# Patient Record
Sex: FEMALE | Race: WHITE | NOT HISPANIC OR LATINO | Employment: UNEMPLOYED | URBAN - NONMETROPOLITAN AREA
[De-identification: names, ages, dates, MRNs, and addresses within clinical notes are randomized per-mention and may not be internally consistent; named-entity substitution may affect disease eponyms.]

---

## 2024-06-08 ENCOUNTER — HOSPITAL ENCOUNTER (EMERGENCY)
Facility: HOSPITAL | Age: 59
Discharge: HOME | End: 2024-06-08
Attending: FAMILY MEDICINE
Payer: COMMERCIAL

## 2024-06-08 VITALS
DIASTOLIC BLOOD PRESSURE: 98 MMHG | HEART RATE: 90 BPM | WEIGHT: 140 LBS | HEIGHT: 61 IN | TEMPERATURE: 98 F | OXYGEN SATURATION: 18 % | SYSTOLIC BLOOD PRESSURE: 124 MMHG | RESPIRATION RATE: 20 BRPM | BODY MASS INDEX: 26.43 KG/M2

## 2024-06-08 DIAGNOSIS — N81.10 VAGINAL PROLAPSE: Primary | ICD-10-CM

## 2024-06-08 PROCEDURE — 63600175 PHARM REV CODE 636 W HCPCS: Performed by: FAMILY MEDICINE

## 2024-06-08 PROCEDURE — 99284 EMERGENCY DEPT VISIT MOD MDM: CPT | Mod: 25

## 2024-06-08 PROCEDURE — 96374 THER/PROPH/DIAG INJ IV PUSH: CPT

## 2024-06-08 RX ORDER — HYDROMORPHONE HYDROCHLORIDE 1 MG/ML
0.5 INJECTION, SOLUTION INTRAMUSCULAR; INTRAVENOUS; SUBCUTANEOUS
Status: COMPLETED | OUTPATIENT
Start: 2024-06-08 | End: 2024-06-08

## 2024-06-08 RX ADMIN — HYDROMORPHONE HYDROCHLORIDE 0.5 MG: 1 INJECTION, SOLUTION INTRAMUSCULAR; INTRAVENOUS; SUBCUTANEOUS at 11:06

## 2024-06-08 NOTE — ED PROVIDER NOTES
Encounter Date: 6/8/2024       History     Chief Complaint   Patient presents with    Vaginal Prolapse     Pt reports that she is having a vaginal/uterine prolapse. Reports she does not have an OB but is trying to find one. Is having a lot of pain after falling and it getting worse a few days ago.      Patient presents for evaluation of vaginal prolapse.  Patient has longstanding history of vaginal prolapse and most recent episode had difficulty reducing her prolapse.  Patient notes having some mild pain at present.  Pain has been constant since onset.  Patient denies having any aggravating or relieving features at present.  Patient denies having any other associated symptoms at present.    The history is provided by the patient.     Review of patient's allergies indicates:  No Known Allergies  History reviewed. No pertinent past medical history.  Past Surgical History:   Procedure Laterality Date    ECTOPIC PREGNANCY SURGERY       No family history on file.     Review of Systems   Constitutional: Negative.    HENT: Negative.     Eyes: Negative.    Respiratory: Negative.     Cardiovascular: Negative.    Gastrointestinal: Negative.    Endocrine: Negative.    Genitourinary: Negative.         Vaginal prolapse   Musculoskeletal: Negative.    Skin: Negative.    Allergic/Immunologic: Negative.    Neurological: Negative.    Hematological: Negative.    Psychiatric/Behavioral: Negative.         Physical Exam     Initial Vitals [06/08/24 1106]   BP Pulse Resp Temp SpO2   (!) 176/73 95 18 98.4 °F (36.9 °C) 97 %      MAP       --         Physical Exam    Vitals reviewed.  Constitutional: She appears well-developed and well-nourished. She is not diaphoretic. No distress.   HENT:   Head: Normocephalic and atraumatic.   Mouth/Throat: No oropharyngeal exudate.   Eyes: EOM are normal. Pupils are equal, round, and reactive to light. Right eye exhibits no discharge. Left eye exhibits no discharge.   Neck: Neck supple. No thyromegaly  present. No tracheal deviation present. No JVD present.   Normal range of motion.  Cardiovascular:  Normal rate, regular rhythm and normal heart sounds.           Pulmonary/Chest: Breath sounds normal. No stridor. No respiratory distress. She has no wheezes.   Abdominal: Abdomen is soft. Bowel sounds are normal. She exhibits no distension. There is no abdominal tenderness. There is no rebound and no guarding.   Genitourinary:    Genitourinary Comments: Vaginal prolapse present.  Reduced manually.     Musculoskeletal:         General: No tenderness or edema. Normal range of motion.      Cervical back: Normal range of motion and neck supple.     Lymphadenopathy:     She has no cervical adenopathy.   Neurological: She is alert and oriented to person, place, and time. She has normal reflexes.         ED Course   Procedures  Labs Reviewed - No data to display       Imaging Results    None          Medications   HYDROmorphone injection 0.5 mg (0.5 mg Intravenous Given 6/8/24 1136)     Medical Decision Making  Risk  Prescription drug management.                        Vaginal prolapse reduction.  Patient given Dilaudid half a mg prior to reduction.  Prolapsed reduced manually with pressure to vagina.  Patient tolerated procedure without difficulty.              Clinical Impression:  Final diagnoses:  [N81.10] Vaginal prolapse (Primary)          ED Disposition Condition    Discharge Stable          ED Prescriptions    None       Follow-up Information    None          Surjit Rushing MD  06/08/24 [id number]

## 2024-08-20 ENCOUNTER — OFFICE VISIT (OUTPATIENT)
Dept: OBSTETRICS AND GYNECOLOGY | Facility: CLINIC | Age: 59
End: 2024-08-20
Payer: MEDICAID

## 2024-08-20 DIAGNOSIS — N95.2 VAGINAL ATROPHY: ICD-10-CM

## 2024-08-20 DIAGNOSIS — Z12.4 SCREENING FOR MALIGNANT NEOPLASM OF THE CERVIX: ICD-10-CM

## 2024-08-20 DIAGNOSIS — N81.3 UTERINE PROCIDENTIA: Primary | ICD-10-CM

## 2024-08-20 PROCEDURE — 3074F SYST BP LT 130 MM HG: CPT | Mod: CPTII,,, | Performed by: OBSTETRICS & GYNECOLOGY

## 2024-08-20 PROCEDURE — 3008F BODY MASS INDEX DOCD: CPT | Mod: CPTII,,, | Performed by: OBSTETRICS & GYNECOLOGY

## 2024-08-20 PROCEDURE — 99214 OFFICE O/P EST MOD 30 MIN: CPT | Mod: ,,, | Performed by: OBSTETRICS & GYNECOLOGY

## 2024-08-20 PROCEDURE — 87624 HPV HI-RISK TYP POOLED RSLT: CPT | Performed by: OBSTETRICS & GYNECOLOGY

## 2024-08-20 PROCEDURE — 3078F DIAST BP <80 MM HG: CPT | Mod: CPTII,,, | Performed by: OBSTETRICS & GYNECOLOGY

## 2024-08-20 PROCEDURE — 1159F MED LIST DOCD IN RCRD: CPT | Mod: CPTII,,, | Performed by: OBSTETRICS & GYNECOLOGY

## 2024-08-20 NOTE — PROGRESS NOTES
"  Chief Complaint     Gynecologic Exam    HPI:     Patient is a 58 y.o.  presents today for evaluation of vaginal prolapse.  Symptoms began about one year ago but if worsened significantly in the last few weeks. C/o intense pressure, pain from prolapse. States, "I am unable to push it back inside". Seen in ER 24 for complaints of worsening vaginal prolapse, difficulty reducing. Prolapse reduced in ER.     Gyn History:    Menstrual History  Cycle: No  Menarche Age: 14 years  No Cycle Reason: Other  Other Reason: Menopause at 51    Menopause  Menopause Age: 1 years  Post Menopausal Bleeding: No  Hormone Replacement Therapy: No    Pap History  Last pap date:  (unknown)  History of Abnormal Pap: No  HPV Vaccine Completed: No (0/3)    [place]  Sexually Active: No  STI History: No  Contraception: Yes  Contraception Type: Tubal ligation/salpingectony    Breast History  Last Breast Imaging Date: Yes  Date:  (unknown)  History of Abnormal Breast Imaging : No  History of Breast Biopsy: No    History reviewed. No pertinent past medical history.    Past Surgical History:   Procedure Laterality Date    ECTOPIC PREGNANCY SURGERY      TUBAL LIGATION      Dr. Dayo Banegas       Family History   Problem Relation Name Age of Onset    Breast cancer Maternal Grandmother          onset unknown    Breast cancer Other MGA         onset unknown    Cervical cancer Neg Hx      Colon cancer Neg Hx      Ovarian cancer Neg Hx      Uterine cancer Neg Hx         OB History          7    Para   5    Term   4       1    AB   2    Living   4         SAB   1    IAB        Ectopic   1    Multiple        Live Births   4                 No current outpatient medications on file prior to visit.     No current facility-administered medications on file prior to visit.       Review of Systems:       Review of Systems   Constitutional:  Negative for chills and fever.   Gastrointestinal:  Negative for abdominal pain, " constipation and diarrhea.   Genitourinary:  Negative for bladder incontinence, decreased libido, dysmenorrhea, dyspareunia, dysuria, flank pain, frequency, genital sores, hematuria, hot flashes, menorrhagia, menstrual problem, pelvic pain, urgency, vaginal bleeding, vaginal discharge, vaginal pain, urinary incontinence, postcoital bleeding, postmenopausal bleeding, vaginal dryness and vaginal odor.        Vaginal prolapse         Physical Exam:    There were no vitals taken for this visit.    Physical Exam   General Exam:    General Appearance: alert, in no acute distress, normal, well nourished.  Psych:  Orientation: time, place, person.  Mood/Affect: Normal   Abdomen:  - Soft. Non-tender. No rebound tenderness or guardingNo masses. Liver normal. Spleen normal. No hernia palpable.  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  There is an ectropion of the cervix.  A Pap was obtained.  There is mild-to-moderate atrophy likely worsened in her appearance by exposure of the vaginal epithelium to air.  Prolapse was reduced.    -Adnexa normal nontender    Assessment:   1. Uterine procidentia  -     conjugated estrogens (PREMARIN) vaginal cream; Place 0.5 g vaginally once daily.  Dispense: 1 applicator; Refill: 5    2. Vaginal atrophy  -     conjugated estrogens (PREMARIN) vaginal cream; Place 0.5 g vaginally once daily.  Dispense: 1 applicator; Refill: 5    3. Screening for malignant neoplasm of the cervix  -     Liquid-Based Pap Smear, Screening             Plan:       PAP w HPV collected    Discussed the exam findings with her.  We discussed procidentia of the uterus.  Generally in young active, sexually active women hysterectomy with repair of the vaginal vault prolapse would be performed.  The ideal surgery the laparoscopic her abdominal sacral colpopexy given her age and extensive prolapse.  She does not like the idea of a pelvic mesh at this time.  We also  discussed the option for vaginal or laparoscopic hysterectomy with anterior-posterior repair sacrospinous ligament fixation axis dermis graft placement.  Discussed the placement of an Axis dermis cadeveric graft to help bolster wound healing and decrease likelihood of recurrent prolapse in the future.  We did discuss the risk of recurrent prolapse in the future.    Recommend vaginal premarin cream   Due to miscarriage surgery schedule next available appointment for this procedure would be in early October.  Patient was understanding.  We discussed possibility of temporary pessary however with take time to order in place.  She understands and agrees with the plan of care she prefers the hysterectomy anterior-posterior repair, graft placement, 6 twice ligament fixation at this time but she will consider options further in the meantime.  Rx premarin cream     RTC 3 weeks to pre-op    This note was transcribed by Lisa Zuleta. There may be transcription errors as a result, however minimal. Effort has been made to ensure accuracy of transcription, but any obvious errors or omissions should be clarified with the author of the document.

## 2024-08-21 VITALS
BODY MASS INDEX: 26.06 KG/M2 | DIASTOLIC BLOOD PRESSURE: 72 MMHG | HEIGHT: 61 IN | WEIGHT: 138 LBS | SYSTOLIC BLOOD PRESSURE: 118 MMHG

## 2024-08-21 PROBLEM — N81.3 UTERINE PROCIDENTIA: Status: ACTIVE | Noted: 2024-08-21

## 2024-08-21 PROBLEM — N95.2 VAGINAL ATROPHY: Status: ACTIVE | Noted: 2024-08-21

## 2024-09-06 NOTE — PROGRESS NOTES
"History & Physical    Subjective     History of Present Illness:  Patient is a 58 y.o. female  with uterine procidentia here today to pre admit for surgical repair.    Gyn History:    Menstrual History  Cycle: No  Menarche Age: 14 years    Menopause  Menopause Age: 1 years  Post Menopausal Bleeding: No  Hormone Replacement Therapy: Yes (vaginal premarin)    Pap History  Last pap date: 24 (unsatafacory PAP with negative HPV)  History of Abnormal Pap: No  HPV Vaccine Completed: No (0/3)    [place]  Sexually Active: No  STI History: No  Contraception: No    Breast History  Last Breast Imaging Date: No  History of Breast Biopsy: No    Per previous note 24:  Patient is a 58 y.o.  presents today for evaluation of vaginal prolapse.  Symptoms began about one year ago but if worsened significantly in the last few weeks. C/o intense pressure, pain from prolapse. States, "I am unable to push it back inside". Seen in ER 24 for complaints of worsening vaginal prolapse, difficulty reducing. Prolapse reduced in ER.     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  There is an ectropion of the cervix.  A Pap was obtained.  There is mild-to-moderate atrophy likely worsened in her appearance by exposure of the vaginal epithelium to air.  Prolapse was reduced.    -Adnexa normal nontender      Review of patient's allergies indicates:  No Known Allergies    Current Outpatient Medications   Medication Sig Dispense Refill    conjugated estrogens (PREMARIN) vaginal cream Place 0.5 g vaginally once daily. 1 applicator 5    hydroCHLOROthiazide (HYDRODIURIL) 12.5 MG Tab Take 1 tablet (12.5 mg total) by mouth once daily. 30 tablet 11     No current facility-administered medications for this visit.       History reviewed. No pertinent past medical history.  Past Surgical History:   Procedure Laterality Date    ECTOPIC PREGNANCY SURGERY  1992    TUBAL LIGATION  " "1995    Dr. Dayo Banegas     Family History   Problem Relation Name Age of Onset    Breast cancer Maternal Grandmother          onset unknown    Breast cancer Other MGA         onset unknown    Cervical cancer Neg Hx      Colon cancer Neg Hx      Ovarian cancer Neg Hx      Uterine cancer Neg Hx       Social History     Tobacco Use    Smoking status: Every Day     Current packs/day: 1.00     Average packs/day: 1 pack/day for 45.7 years (45.7 ttl pk-yrs)     Types: Cigarettes     Start date: 1979    Smokeless tobacco: Never   Substance Use Topics    Alcohol use: Not Currently     Comment: on ocassion    Drug use: Yes     Types: Marijuana        Review of Systems:  Review of Systems   Respiratory: Negative.     Cardiovascular: Negative.    Gastrointestinal: Negative.    Genitourinary:         Prolapse           Objective     Vital Signs (Most Recent)  BP: (!) 150/90 (09/10/24 1424)  5' 1" (1.549 m)  60.1 kg (132 lb 9.6 oz)     Physical Exam:  Physical Exam    Physical Exam  Gen: NAD  Cardio: RRR  Lungs CTA b/l  Abd: Soft, NT  Ext: no CCE       Assessment and Plan   1. Uterine procidentia  - Case Request Operating Room: COMBINED ANTEROPOSTERIOR COLPORRHAPHY, WITH CYSTOSCOPY, FIXATION, LIGAMENT, SACROSPINOUS, HYSTERECTOMY,VAGINAL,WITH SALPINGECTOMY  - Full code; Standing  - Vital signs; Standing  - Insert peripheral IV; Standing  - [place] to Gravity; Standing  - POCT glucose; Standing  - Notify physician if BS > 180 for hysterectomy patients; Standing  - Chlorhexidine (CHG) 2% Wipes; Standing  - Notify Physician/Vital Signs Parameters Urine output less than 0.5mL/kg/hr (with indwelling catheter) or 30 mL/hr (without indwelling catheter) or blood glucose greater than 200 mg/dL; Standing  - Notify physician; Standing  - Notify Physician - Potential Need of Opioid Reversal; Standing  - Diet NPO; Standing  - Chlorohexidine Gluconate Bath; Standing  - Place in Outpatient; Standing  - Place sequential compression device; " Standing  - Comprehensive metabolic panel; Standing  - CBC auto differential; Standing  - Pregnancy, urine rapid; Standing  - Urinalysis, Reflex to Urine Culture; Standing  - Type & Screen Pre Op; Standing  - EKG 12-lead; Standing    2. Hypertension, unspecified type        PLAN:    Again we reviewed the topic of repair similar to previous conversation.  Discussed the exam findings with her.  We discussed procidentia of the uterus.  Generally in young active, sexually active women hysterectomy with repair of the vaginal vault prolapse would be performed.  The ideal surgery the laparoscopic her abdominal sacral colpopexy given her age and extensive prolapse.  She does not like the idea of a pelvic mesh at this time.  We also discussed the option for vaginal or laparoscopic hysterectomy with anterior-posterior repair sacrospinous ligament fixation axis dermis graft placement.  Discussed the placement of a category epic graft to help bolster wound healing and decrease likelihood of recurrent prolapse in the future.  We did discuss the risk of recurrent prolapse in the future.     Continue vaginal premarin cream     She prefers vaginal hysterectomy with A&P repair sacrospinous suspension axis dermis graft placement  Will plan for surgery on 10/10/24  Consent given, NPO after midnight prior to procedure     HCTZ 12.5 for HTN  Referral for PCP establishment, HTN management   HTN precautions given      RTC post op    This note was transcribed by Lisa Zuleta. There may be transcription errors as a result, however minimal. Effort has been made to ensure accuracy of transcription, but any obvious errors or omissions should be clarified with the author of the document.       I agree with the above documentation.

## 2024-09-10 ENCOUNTER — OFFICE VISIT (OUTPATIENT)
Dept: OBSTETRICS AND GYNECOLOGY | Facility: CLINIC | Age: 59
End: 2024-09-10
Payer: MEDICAID

## 2024-09-10 VITALS
HEIGHT: 61 IN | DIASTOLIC BLOOD PRESSURE: 90 MMHG | BODY MASS INDEX: 25.04 KG/M2 | SYSTOLIC BLOOD PRESSURE: 150 MMHG | WEIGHT: 132.63 LBS

## 2024-09-10 DIAGNOSIS — I10 HYPERTENSION, UNSPECIFIED TYPE: ICD-10-CM

## 2024-09-10 DIAGNOSIS — N81.3 UTERINE PROCIDENTIA: Primary | ICD-10-CM

## 2024-09-10 PROCEDURE — 99214 OFFICE O/P EST MOD 30 MIN: CPT | Mod: ,,, | Performed by: OBSTETRICS & GYNECOLOGY

## 2024-09-10 PROCEDURE — 3077F SYST BP >= 140 MM HG: CPT | Mod: CPTII,,, | Performed by: OBSTETRICS & GYNECOLOGY

## 2024-09-10 PROCEDURE — 3008F BODY MASS INDEX DOCD: CPT | Mod: CPTII,,, | Performed by: OBSTETRICS & GYNECOLOGY

## 2024-09-10 PROCEDURE — 1159F MED LIST DOCD IN RCRD: CPT | Mod: CPTII,,, | Performed by: OBSTETRICS & GYNECOLOGY

## 2024-09-10 PROCEDURE — 3080F DIAST BP >= 90 MM HG: CPT | Mod: CPTII,,, | Performed by: OBSTETRICS & GYNECOLOGY

## 2024-09-10 RX ORDER — MUPIROCIN 20 MG/G
OINTMENT TOPICAL
OUTPATIENT
Start: 2024-09-10

## 2024-09-10 RX ORDER — FAMOTIDINE 20 MG/1
20 TABLET, FILM COATED ORAL
OUTPATIENT
Start: 2024-09-10

## 2024-09-10 RX ORDER — HYDROCHLOROTHIAZIDE 12.5 MG/1
12.5 TABLET ORAL DAILY
Qty: 30 TABLET | Refills: 11 | Status: SHIPPED | OUTPATIENT
Start: 2024-09-10 | End: 2025-09-10

## 2024-09-10 RX ORDER — CEFAZOLIN SODIUM 2 G/50ML
2 SOLUTION INTRAVENOUS
OUTPATIENT
Start: 2024-09-10

## 2024-09-10 RX ORDER — SODIUM CHLORIDE 9 MG/ML
INJECTION, SOLUTION INTRAVENOUS CONTINUOUS
OUTPATIENT
Start: 2024-09-10

## 2024-09-16 ENCOUNTER — OFFICE VISIT (OUTPATIENT)
Dept: FAMILY MEDICINE | Facility: CLINIC | Age: 59
End: 2024-09-16
Payer: MEDICAID

## 2024-09-16 VITALS
HEART RATE: 102 BPM | SYSTOLIC BLOOD PRESSURE: 156 MMHG | OXYGEN SATURATION: 98 % | TEMPERATURE: 98 F | HEIGHT: 61 IN | DIASTOLIC BLOOD PRESSURE: 98 MMHG | BODY MASS INDEX: 25.3 KG/M2 | WEIGHT: 134 LBS

## 2024-09-16 DIAGNOSIS — Z76.89 ENCOUNTER TO ESTABLISH CARE: Primary | ICD-10-CM

## 2024-09-16 DIAGNOSIS — Z12.31 ENCOUNTER FOR SCREENING MAMMOGRAM FOR MALIGNANT NEOPLASM OF BREAST: ICD-10-CM

## 2024-09-16 DIAGNOSIS — F17.200 TOBACCO DEPENDENCE: ICD-10-CM

## 2024-09-16 DIAGNOSIS — N81.3 UTERINE PROCIDENTIA: ICD-10-CM

## 2024-09-16 DIAGNOSIS — Z12.11 COLON CANCER SCREENING: ICD-10-CM

## 2024-09-16 DIAGNOSIS — I10 HYPERTENSION, UNSPECIFIED TYPE: ICD-10-CM

## 2024-09-16 PROBLEM — R03.0 ELEVATED BLOOD PRESSURE READING: Status: RESOLVED | Noted: 2024-09-16 | Resolved: 2024-09-16

## 2024-09-16 PROBLEM — R03.0 ELEVATED BLOOD PRESSURE READING: Status: ACTIVE | Noted: 2024-09-16

## 2024-09-16 PROCEDURE — 3008F BODY MASS INDEX DOCD: CPT | Mod: CPTII,,, | Performed by: NURSE PRACTITIONER

## 2024-09-16 PROCEDURE — 1160F RVW MEDS BY RX/DR IN RCRD: CPT | Mod: CPTII,,, | Performed by: NURSE PRACTITIONER

## 2024-09-16 PROCEDURE — 1159F MED LIST DOCD IN RCRD: CPT | Mod: CPTII,,, | Performed by: NURSE PRACTITIONER

## 2024-09-16 PROCEDURE — 99204 OFFICE O/P NEW MOD 45 MIN: CPT | Mod: ,,, | Performed by: NURSE PRACTITIONER

## 2024-09-16 PROCEDURE — 3080F DIAST BP >= 90 MM HG: CPT | Mod: CPTII,,, | Performed by: NURSE PRACTITIONER

## 2024-09-16 PROCEDURE — 3077F SYST BP >= 140 MM HG: CPT | Mod: CPTII,,, | Performed by: NURSE PRACTITIONER

## 2024-09-16 RX ORDER — HYDROCHLOROTHIAZIDE 12.5 MG/1
12.5 TABLET ORAL DAILY
COMMUNITY

## 2024-09-16 NOTE — ASSESSMENT & PLAN NOTE
Educated patient on need to identify triggers for cigarette smoking and to find an alternative to alleviate these triggers such as walking, eating unsalted sunflower seeds, eating carrots. Advised patient to develop a plan to quit smoking whether it is to decrease by a few cigarettes every 3-5 days or quit cold turkey and to schedule a quit day. Patient states understanding.

## 2024-09-16 NOTE — PROGRESS NOTES
Patient ID: Naye Aguilar  : 1965    Chief Complaint: Hypertension    Allergies: Patient has No Known Allergies.     History of Present Illness:  The patient is a 59 y.o. White female who presents to clinic for follow up on Hypertension     Here today to establish care. She was referred her by Dr Banegas. She has vaginal prolapse and she is set to do surgery but her blood pressure is very high. Her blood pressure is very elevated at the start of visit today as well. She denies any diagnosis of hypertension in the past.  She tells me that Dr. Banegas just sent a prescription for a blood pressure medication to her pharmacy but she has not picked it up yet.    She is feeling physically well but mentally she is feeling very stressed.  She is a smoker; started at age 15. Smokes about a ppd currently. Occasional marijuana. No other illicit drugs and does not drink ETOH.     Denies changes in bowel patterns, no melena, hematochezia, rectal pain, rectal bleeding, or changes in caliber of stool.  Does not have a family hx of colon cancer      Social History:  reports that she has been smoking cigarettes. She started smoking about 45 years ago. She has a 45.7 pack-year smoking history. She has never used smokeless tobacco. She reports that she does not currently use alcohol. She reports current drug use. Drug: Marijuana.    Past Medical History:  has no past medical history on file.    Current Medications:  Current Outpatient Medications   Medication Instructions    conjugated estrogens (PREMARIN) 0.5 g, Vaginal, Daily    hydroCHLOROthiazide (HYDRODIURIL) 12.5 mg, Oral, Daily       Review of Systems   Constitutional:  Negative for activity change, appetite change, fatigue and unexpected weight change.   HENT:  Negative for ear pain and hearing loss.    Eyes:  Negative for visual disturbance.   Respiratory:  Negative for apnea, cough, chest tightness, shortness of breath and wheezing.    Cardiovascular:  Negative for  "chest pain, palpitations, leg swelling and claudication.   Gastrointestinal:  Negative for abdominal pain, anal bleeding, blood in stool, change in bowel habit, nausea, vomiting and reflux.   Endocrine: Negative for cold intolerance, heat intolerance, polydipsia, polyphagia and polyuria.   Genitourinary:  Negative for dysuria, frequency, hematuria and urgency.   Musculoskeletal:  Negative for arthralgias.   Integumentary:  Negative for rash and mole/lesion.   Allergic/Immunologic: Negative for environmental allergies.   Neurological:  Negative for dizziness, headaches and memory loss.        Visit Vitals  BP (!) 156/98 (BP Location: Left arm, Patient Position: Sitting, BP Method: Medium (Manual))   Pulse 102   Temp 98.1 °F (36.7 °C) (Temporal)   Ht 5' 1" (1.549 m)   Wt 60.8 kg (134 lb)   SpO2 98%   BMI 25.32 kg/m²       Physical Exam  Vitals reviewed.   Constitutional:       Appearance: Normal appearance. She is normal weight.   Eyes:      Conjunctiva/sclera: Conjunctivae normal.   Cardiovascular:      Rate and Rhythm: Normal rate and regular rhythm.      Pulses: Normal pulses.      Heart sounds: Normal heart sounds.   Pulmonary:      Effort: Pulmonary effort is normal.      Breath sounds: Normal breath sounds.   Abdominal:      General: Bowel sounds are normal.      Palpations: Abdomen is soft.   Musculoskeletal:      Cervical back: Neck supple.   Skin:     General: Skin is warm and dry.      Capillary Refill: Capillary refill takes less than 2 seconds.   Neurological:      Mental Status: She is alert and oriented to person, place, and time.   Psychiatric:         Mood and Affect: Mood normal.         Behavior: Behavior normal.            Assessment & Plan:  1. Encounter to establish care  -     CBC Auto Differential; Future; Expected date: 09/16/2024  -     Comprehensive Metabolic Panel; Future; Expected date: 09/16/2024  -     Lipid Panel; Future; Expected date: 09/16/2024  -     TSH; Future; Expected date: " 09/16/2024  -     Hemoglobin A1C; Future; Expected date: 09/16/2024  -     T4, Free; Future; Expected date: 09/16/2024    2. Hypertension, unspecified type  Assessment & Plan:  Well controlled.   Start HCTZ 12.5 mg daily (as prescribed by Dr Banegas)  Low Sodium Diet (DASH Diet - Less than 2 grams of sodium per day).  Monitor blood pressure daily and log. Report consistent numbers greater than 140/90.  Maintain healthy weight with goal BMI <30. Exercise 30 minutes per day, 5 days per week.  Smoking cessation encouraged to aid in BP reduction.      Orders:  -     Ambulatory referral/consult to Family Practice  -     CBC Auto Differential; Future; Expected date: 09/16/2024  -     Comprehensive Metabolic Panel; Future; Expected date: 09/16/2024    3. Uterine procidentia  Overview:  Est with GYN-Dr Banegas      4. Tobacco dependence  Assessment & Plan:  Educated patient on need to identify triggers for cigarette smoking and to find an alternative to alleviate these triggers such as walking, eating unsalted sunflower seeds, eating carrots. Advised patient to develop a plan to quit smoking whether it is to decrease by a few cigarettes every 3-5 days or quit cold turkey and to schedule a quit day. Patient states understanding.         5. Encounter for screening mammogram for malignant neoplasm of breast  -     Mammo Digital Screening Bilat w/ [facility]; Future; Expected date: 09/16/2024    6. Colon cancer screening  -     Cologuard Screening (Multitarget Stool DNA); Future; Expected date: 09/16/2024         Follow up for 2 wk f/u, HTN, Fasting Labs prior. Call sooner if needed.    JEREMI Douglas-C

## 2024-09-16 NOTE — ASSESSMENT & PLAN NOTE
Well controlled.   Start HCTZ 12.5 mg daily (as prescribed by Dr Banegas)  Low Sodium Diet (DASH Diet - Less than 2 grams of sodium per day).  Monitor blood pressure daily and log. Report consistent numbers greater than 140/90.  Maintain healthy weight with goal BMI <30. Exercise 30 minutes per day, 5 days per week.  Smoking cessation encouraged to aid in BP reduction.

## 2024-09-17 ENCOUNTER — TELEPHONE (OUTPATIENT)
Dept: OBSTETRICS AND GYNECOLOGY | Facility: CLINIC | Age: 59
End: 2024-09-17
Payer: MEDICAID

## 2024-09-17 DIAGNOSIS — N95.2 VAGINAL ATROPHY: ICD-10-CM

## 2024-09-17 DIAGNOSIS — N81.3 UTERINE PROCIDENTIA: ICD-10-CM

## 2024-09-17 NOTE — TELEPHONE ENCOUNTER
Medication sent to [facility] per pt request.   Surgery case request is for 10/9. Per note and per calendar in Dr Freedman's office, 10/10. Will need to clarify with him when he RTC next week.   Pt voiced understanding.

## 2024-09-17 NOTE — TELEPHONE ENCOUNTER
----- Message from Aminta Fierro sent at 9/17/2024 10:27 AM CDT -----  Regarding: SURGERY  She prefers vaginal hysterectomy with A&P repair sacrospinous suspension axis dermis graft placement  Will plan for surgery on 10/10/24  Consent given, NPO after midnight prior to procedure     But its scheduled 10/9/24 can this be verified and call patient. [phone]    Also, requested to have her premarian cream sent to the [facility] instead. She made a mistake on the pharmacy request.

## 2024-09-30 ENCOUNTER — HOSPITAL ENCOUNTER (OUTPATIENT)
Dept: PREADMISSION TESTING | Facility: HOSPITAL | Age: 59
Discharge: HOME | End: 2024-09-30
Attending: OBSTETRICS & GYNECOLOGY
Payer: MEDICAID

## 2024-09-30 ENCOUNTER — HOSPITAL ENCOUNTER (OUTPATIENT)
Dept: RADIOLOGY | Facility: HOSPITAL | Age: 59
Discharge: HOME | End: 2024-09-30
Attending: NURSE PRACTITIONER
Payer: MEDICAID

## 2024-09-30 VITALS — HEIGHT: 60 IN | WEIGHT: 134 LBS | BODY MASS INDEX: 26.31 KG/M2

## 2024-09-30 DIAGNOSIS — N81.3 UTERINE PROCIDENTIA: ICD-10-CM

## 2024-09-30 DIAGNOSIS — Z12.31 ENCOUNTER FOR SCREENING MAMMOGRAM FOR MALIGNANT NEOPLASM OF BREAST: ICD-10-CM

## 2024-09-30 DIAGNOSIS — Z01.818 PRE-OP EVALUATION: ICD-10-CM

## 2024-09-30 LAB
ALBUMIN SERPL-MCNC: 4.6 G/DL (ref 3.4–5)
ALBUMIN/GLOB SERPL: 1.7 RATIO
ALP SERPL-CCNC: 84 UNIT/L (ref 50–144)
ALT SERPL-CCNC: 29 UNIT/L (ref 1–45)
ANION GAP SERPL CALC-SCNC: 5 MEQ/L (ref 2–13)
AST SERPL-CCNC: 28 UNIT/L (ref 14–36)
BACTERIA #/AREA URNS AUTO: NORMAL /HPF
BASOPHILS # BLD AUTO: 0.07 X10(3)/MCL (ref 0.01–0.08)
BASOPHILS NFR BLD AUTO: 0.8 % (ref 0.1–1.2)
BILIRUB SERPL-MCNC: 0.2 MG/DL (ref 0–1)
BILIRUB UR QL STRIP.AUTO: NEGATIVE
BUN SERPL-MCNC: 26 MG/DL (ref 7–20)
CALCIUM SERPL-MCNC: 10.2 MG/DL (ref 8.4–10.2)
CHLORIDE SERPL-SCNC: 100 MMOL/L (ref 98–110)
CLARITY UR: CLEAR
CO2 SERPL-SCNC: 34 MMOL/L (ref 21–32)
COLOR UR AUTO: YELLOW
CREAT SERPL-MCNC: 1.11 MG/DL (ref 0.66–1.25)
CREAT/UREA NIT SERPL: 23 (ref 12–20)
EOSINOPHIL # BLD AUTO: 0.42 X10(3)/MCL (ref 0.04–0.36)
EOSINOPHIL NFR BLD AUTO: 4.8 % (ref 0.7–7)
ERYTHROCYTE [DISTWIDTH] IN BLOOD BY AUTOMATED COUNT: 12.9 % (ref 11–14.5)
GFR SERPLBLD CREATININE-BSD FMLA CKD-EPI: 58 ML/MIN/1.73/M2
GLOBULIN SER-MCNC: 2.7 GM/DL (ref 2–3.9)
GLUCOSE SERPL-MCNC: 101 MG/DL (ref 70–115)
GLUCOSE UR QL STRIP: NEGATIVE
HCT VFR BLD AUTO: 37.8 % (ref 36–48)
HGB BLD-MCNC: 12.6 G/DL (ref 11.8–16)
HGB UR QL STRIP: ABNORMAL
IMM GRANULOCYTES # BLD AUTO: 0.02 X10(3)/MCL (ref 0–0.03)
IMM GRANULOCYTES NFR BLD AUTO: 0.2 % (ref 0–0.5)
KETONES UR QL STRIP: NEGATIVE
LEUKOCYTE ESTERASE UR QL STRIP: NEGATIVE
LYMPHOCYTES # BLD AUTO: 3.48 X10(3)/MCL (ref 1.16–3.74)
LYMPHOCYTES NFR BLD AUTO: 40.2 % (ref 20–55)
MCH RBC QN AUTO: 31.7 PG (ref 27–34)
MCHC RBC AUTO-ENTMCNC: 33.3 G/DL (ref 31–37)
MCV RBC AUTO: 95.2 FL (ref 79–99)
MONOCYTES # BLD AUTO: 0.77 X10(3)/MCL (ref 0.24–0.36)
MONOCYTES NFR BLD AUTO: 8.9 % (ref 4.7–12.5)
NEUTROPHILS # BLD AUTO: 3.9 X10(3)/MCL (ref 1.56–6.13)
NEUTROPHILS NFR BLD AUTO: 45.1 % (ref 37–73)
NITRITE UR QL STRIP: NEGATIVE
NRBC BLD AUTO-RTO: 0 %
PH UR STRIP: 7 [PH]
PLATELET # BLD AUTO: 331 X10(3)/MCL (ref 140–371)
PMV BLD AUTO: 11.3 FL (ref 9.4–12.4)
POTASSIUM SERPL-SCNC: 3.8 MMOL/L (ref 3.5–5.1)
PROT SERPL-MCNC: 7.3 GM/DL (ref 6.3–8.2)
PROT UR QL STRIP: NEGATIVE
RBC # BLD AUTO: 3.97 X10(6)/MCL (ref 4–5.1)
RBC #/AREA URNS AUTO: NORMAL /HPF
SODIUM SERPL-SCNC: 139 MMOL/L (ref 136–145)
SP GR UR STRIP.AUTO: 1.02 (ref 1–1.03)
SQUAMOUS #/AREA URNS AUTO: NORMAL /HPF
UROBILINOGEN UR STRIP-ACNC: 0.2
WBC # BLD AUTO: 8.66 X10(3)/MCL (ref 4–11.5)
WBC #/AREA URNS AUTO: NORMAL /HPF

## 2024-09-30 PROCEDURE — 85025 COMPLETE CBC W/AUTO DIFF WBC: CPT | Performed by: OBSTETRICS & GYNECOLOGY

## 2024-09-30 PROCEDURE — 81003 URINALYSIS AUTO W/O SCOPE: CPT | Performed by: OBSTETRICS & GYNECOLOGY

## 2024-09-30 PROCEDURE — 93005 ELECTROCARDIOGRAM TRACING: CPT

## 2024-09-30 PROCEDURE — 81015 MICROSCOPIC EXAM OF URINE: CPT | Performed by: OBSTETRICS & GYNECOLOGY

## 2024-09-30 PROCEDURE — 77067 SCR MAMMO BI INCL CAD: CPT | Mod: TC

## 2024-09-30 PROCEDURE — 80053 COMPREHEN METABOLIC PANEL: CPT | Performed by: OBSTETRICS & GYNECOLOGY

## 2024-09-30 PROCEDURE — 93010 ELECTROCARDIOGRAM REPORT: CPT | Mod: ,,, | Performed by: INTERNAL MEDICINE

## 2024-09-30 NOTE — DISCHARGE INSTRUCTIONS

## 2024-10-01 ENCOUNTER — TELEPHONE (OUTPATIENT)
Dept: OBSTETRICS AND GYNECOLOGY | Facility: CLINIC | Age: 59
End: 2024-10-01
Payer: MEDICAID

## 2024-10-01 ENCOUNTER — TELEPHONE (OUTPATIENT)
Dept: FAMILY MEDICINE | Facility: CLINIC | Age: 59
End: 2024-10-01

## 2024-10-01 DIAGNOSIS — R94.31 ABNORMAL ECG: Primary | ICD-10-CM

## 2024-10-01 DIAGNOSIS — R19.5 POSITIVE COLORECTAL CANCER SCREENING USING COLOGUARD TEST: Primary | ICD-10-CM

## 2024-10-01 LAB
OHS QRS DURATION: 82 MS
OHS QTC CALCULATION: 479 MS

## 2024-10-01 NOTE — TELEPHONE ENCOUNTER
I called the patient and relayed Dr. Banegas's message.     She stated that she doesn't have a cardiologist. I let her know that I will send a referral to [facility] in [place].     I let her know to call the office in 1-2 weeks if they haven't called to schedule an appt.     She verbalized a clear understanding and agrees with the plan of care.

## 2024-10-01 NOTE — TELEPHONE ENCOUNTER
----- Message from ASHA Malagon sent at 10/1/2024  7:17 AM CDT -----  POSITIVE Cologuard. Will need further evaluation with diagnostic colonoscopy. Please ensure the patient has been referred and scheduled to prevent delay in care.

## 2024-10-01 NOTE — TELEPHONE ENCOUNTER
----- Message from Tano Banegas MD sent at 10/1/2024  9:56 AM CDT -----  Cardiology referral for prolonged QT and abnormal ECG

## 2024-10-03 NOTE — TELEPHONE ENCOUNTER
She said that she will go to anyone in town except Dr. Thomas. However, she is scheduled for a hysterectomy on 10/10 and has a cardiology clearance on 10/8. She has not done her labs for you and isn't sure when she will be able to get out after her procedure.

## 2024-10-03 NOTE — TELEPHONE ENCOUNTER
I called and notified pt. she verbalized understanding. She is going to call us in 4-6 weeks to schedule a HTN follow up w/ labs prior once she sees how she is feeling.

## 2024-10-03 NOTE — TELEPHONE ENCOUNTER
I went a head and referred her to Dr. Clark's office.  I understand with everything she has going on that she may not be able to schedule right away.  She can just explain that to them when they call and perhaps schedule an appointment later in the year or even early next year if she feels the need.

## 2024-10-07 ENCOUNTER — PATIENT MESSAGE (OUTPATIENT)
Dept: FAMILY MEDICINE | Facility: CLINIC | Age: 59
End: 2024-10-07
Payer: MEDICAID

## 2024-10-09 ENCOUNTER — ANESTHESIA EVENT (OUTPATIENT)
Dept: SURGERY | Facility: HOSPITAL | Age: 59
End: 2024-10-09
Payer: MEDICAID

## 2024-10-09 NOTE — ANESTHESIA PREPROCEDURE EVALUATION
10/09/2024  Naye Aguilar is a 59 y.o., female.    urgical History    Procedure Laterality Date Comment Source   ECTOPIC PREGNANCY SURGERY  1992     TUBAL LIGATION  1995 Dr. Dayo Banegas      Medical History    Diagnosis Date Comment Source   Hypertension        Pre-op Assessment    I have reviewed the Patient Summary Reports.     I have reviewed the Nursing Notes. I have reviewed the NPO Status.   I have reviewed the Medications.     Review of Systems  Anesthesia Hx:  No problems with previous Anesthesia             Denies Family Hx of Anesthesia complications.    Denies Personal Hx of Anesthesia complications.                    Social:  Smoker       Hematology/Oncology:  Hematology Normal   Oncology Normal                                   EENT/Dental:  EENT/Dental Normal           Cardiovascular:  Exercise tolerance: poor   Hypertension              ECG has been reviewed. Test Reason : Z01.818,    Vent. Rate : 100 BPM     Atrial Rate : 100 BPM     P-R Int : 128 ms          QRS Dur : 082 ms      QT Int : 372 ms       P-R-T Axes : 082 068 148 degrees     QTc Int : 479 ms    Normal sinus rhythm  Minimal voltage criteria for LVH, may be normal variant ( Sokolow-Carrero )  Nonspecific ST and T wave abnormality  Prolonged QT  Abnormal ECG  No previous ECGs available  Confirmed by Manuel SANCHEZ, Mitchel ([id number]) on 10/1/2024 8:15:06 AM    Referred By: BURKE BANEGAS           Confirmed By:Mitchel Jackson MD    Specimen Collected: 09/30/24 14:06 CDT Last Resulted: 10/01/24 08:15 CDT                                  Pulmonary:  Pulmonary Normal                       Renal/:  Chronic Renal Disease   Per lab values             Hepatic/GI:  Hepatic/GI Normal                    Musculoskeletal:  Musculoskeletal Normal                Neurological:  Neurology Normal                                      Endocrine:  Endocrine Normal             Dermatological:  Skin Normal    Psych:  Psychiatric Normal                    Physical Exam  General: Well nourished, Cooperative, Alert and Oriented    Airway:  Mallampati: II / II  Mouth Opening: Normal  TM Distance: Normal  Tongue: Normal  Neck ROM: Normal ROM    Dental:  Dentures        Anesthesia Plan  Type of Anesthesia, risks & benefits discussed:    Anesthesia Type: Gen ETT  Intra-op Monitoring Plan: Standard ASA Monitors  Post Op Pain Control Plan: multimodal analgesia  Induction:  IV  Airway Plan: Direct  Informed Consent: Informed consent signed with the Patient and all parties understand the risks and agree with anesthesia plan.  All questions answered. Patient consented to blood products? Yes  ASA Score: 3  Day of Surgery Review of History & Physical: H&P Update referred to the surgeon/provider.I have interviewed and examined the patient. I have reviewed the patient's H&P dated: There are no significant changes.     Ready For Surgery From Anesthesia Perspective.     .

## 2024-10-10 ENCOUNTER — ANESTHESIA (OUTPATIENT)
Dept: SURGERY | Facility: HOSPITAL | Age: 59
End: 2024-10-10
Payer: MEDICAID

## 2024-10-10 ENCOUNTER — HOSPITAL ENCOUNTER (OUTPATIENT)
Dept: PREADMISSION TESTING | Facility: HOSPITAL | Age: 59
Discharge: HOME | End: 2024-10-10
Attending: FAMILY MEDICINE
Payer: MEDICAID

## 2024-10-10 ENCOUNTER — ANESTHESIA EVENT (OUTPATIENT)
Dept: GASTROENTEROLOGY | Facility: HOSPITAL | Age: 59
End: 2024-10-10
Payer: MEDICAID

## 2024-10-10 VITALS — WEIGHT: 135 LBS | HEIGHT: 60 IN | BODY MASS INDEX: 26.5 KG/M2

## 2024-10-10 DIAGNOSIS — Z12.11 COLON CANCER SCREENING: Primary | ICD-10-CM

## 2024-10-10 RX ORDER — SODIUM CHLORIDE, SODIUM LACTATE, POTASSIUM CHLORIDE, CALCIUM CHLORIDE 600; 310; 30; 20 MG/100ML; MG/100ML; MG/100ML; MG/100ML
INJECTION, SOLUTION INTRAVENOUS CONTINUOUS
Status: CANCELLED | OUTPATIENT
Start: 2024-10-11

## 2024-10-10 NOTE — ANESTHESIA PREPROCEDURE EVALUATION
10/10/2024  Naye Aguilar is a 59 y.o., female.      Pre-op Assessment    I have reviewed the Patient Summary Reports.     I have reviewed the Nursing Notes. I have reviewed the NPO Status.   I have reviewed the Medications.     Review of Systems  Anesthesia Hx:  No problems with previous Anesthesia             Denies Family Hx of Anesthesia complications.    Denies Personal Hx of Anesthesia complications.                    Social:  Smoker, Recreational Drugs Weed use      Hematology/Oncology:  Hematology Normal   Oncology Normal                                   EENT/Dental:  EENT/Dental Normal           Cardiovascular:  Exercise tolerance: good   Hypertension                                        Pulmonary:  Pulmonary Normal                       Renal/:  Renal/ Normal                 Hepatic/GI:  Hepatic/GI Normal                 Musculoskeletal:  Musculoskeletal Normal                Neurological:  Neurology Normal                                      Endocrine:  Endocrine Normal            Dermatological:  Skin Normal    Psych:  Psychiatric Normal                    Physical Exam  General: Well nourished, Cooperative, Alert and Oriented    Airway:  Mallampati: II / II  Mouth Opening: Normal  TM Distance: Normal  Tongue: Normal  Neck ROM: Normal ROM    Dental:  Intact        Anesthesia Plan  Type of Anesthesia, risks & benefits discussed:    Anesthesia Type: MAC  Intra-op Monitoring Plan: Standard ASA Monitors  Post Op Pain Control Plan: multimodal analgesia  Induction:  IV  Airway Plan: Direct  Informed Consent: Informed consent signed with the Patient and all parties understand the risks and agree with anesthesia plan.  All questions answered. Patient consented to blood products? Yes  ASA Score: 2  Day of Surgery Review of History & Physical: H&P Update referred to the surgeon/provider.I have  interviewed and examined the patient. I have reviewed the patient's H&P dated: There are no significant changes.     Ready For Surgery From Anesthesia Perspective.     .

## 2024-10-10 NOTE — DISCHARGE INSTRUCTIONS

## 2024-10-11 ENCOUNTER — HOSPITAL ENCOUNTER (OUTPATIENT)
Dept: GASTROENTEROLOGY | Facility: HOSPITAL | Age: 59
Discharge: HOME | End: 2024-10-11
Attending: FAMILY MEDICINE
Payer: MEDICAID

## 2024-10-11 ENCOUNTER — ANESTHESIA (OUTPATIENT)
Dept: GASTROENTEROLOGY | Facility: HOSPITAL | Age: 59
End: 2024-10-11
Payer: MEDICAID

## 2024-10-11 VITALS
RESPIRATION RATE: 18 BRPM | TEMPERATURE: 97 F | HEART RATE: 74 BPM | BODY MASS INDEX: 26.35 KG/M2 | SYSTOLIC BLOOD PRESSURE: 139 MMHG | WEIGHT: 134.94 LBS | OXYGEN SATURATION: 100 % | DIASTOLIC BLOOD PRESSURE: 63 MMHG

## 2024-10-11 DIAGNOSIS — Z12.11 COLON CANCER SCREENING: ICD-10-CM

## 2024-10-11 DIAGNOSIS — Z12.11 SCREEN FOR COLON CANCER: ICD-10-CM

## 2024-10-11 PROCEDURE — 37000008 HC ANESTHESIA 1ST 15 MINUTES

## 2024-10-11 PROCEDURE — 63600175 PHARM REV CODE 636 W HCPCS: Performed by: NURSE ANESTHETIST, CERTIFIED REGISTERED

## 2024-10-11 PROCEDURE — 37000009 HC ANESTHESIA EA ADD 15 MINS

## 2024-10-11 PROCEDURE — 27201423 OPTIME MED/SURG SUP & DEVICES STERILE SUPPLY

## 2024-10-11 PROCEDURE — 45380 COLONOSCOPY AND BIOPSY: CPT | Performed by: FAMILY MEDICINE

## 2024-10-11 RX ORDER — SODIUM CHLORIDE, SODIUM LACTATE, POTASSIUM CHLORIDE, CALCIUM CHLORIDE 600; 310; 30; 20 MG/100ML; MG/100ML; MG/100ML; MG/100ML
INJECTION, SOLUTION INTRAVENOUS CONTINUOUS
Status: DISCONTINUED | OUTPATIENT
Start: 2024-10-11 | End: 2024-10-12 | Stop reason: HOSPADM

## 2024-10-11 RX ORDER — PROPOFOL 10 MG/ML
VIAL (ML) INTRAVENOUS
Status: DISCONTINUED | OUTPATIENT
Start: 2024-10-11 | End: 2024-10-11

## 2024-10-11 RX ORDER — LIDOCAINE HYDROCHLORIDE 20 MG/ML
INJECTION INTRAVENOUS
Status: DISCONTINUED | OUTPATIENT
Start: 2024-10-11 | End: 2024-10-11

## 2024-10-11 RX ADMIN — PROPOFOL 60 MG: 10 INJECTION, EMULSION INTRAVENOUS at 01:10

## 2024-10-11 RX ADMIN — PROPOFOL 50 MG: 10 INJECTION, EMULSION INTRAVENOUS at 01:10

## 2024-10-11 RX ADMIN — LIDOCAINE HYDROCHLORIDE 50 MG: 20 INJECTION, SOLUTION INTRAVENOUS at 01:10

## 2024-10-11 RX ADMIN — PROPOFOL 70 MG: 10 INJECTION, EMULSION INTRAVENOUS at 01:10

## 2024-10-11 NOTE — DISCHARGE SUMMARY
[facility]  [facility] Stay    Colonoscopy      OUTCOME: Patient tolerated treatment/procedure well without complication and is now ready for discharge.    DISPOSITION: Home or Self Care    FINAL DIAGNOSIS:  <principal problem not specified>    FOLLOWUP: In clinic    DISCHARGE INSTRUCTIONS:  No discharge procedures on file.     TIME SPENT ON DISCHARGE:  minutes  
119

## 2024-10-11 NOTE — DISCHARGE INSTRUCTIONS
Follow-up with Dr Gabriel  Diet: as tolerated  Activity:  decrease activity today, no driving today, resume all activity tomorrow  Notify MD:  increased swelling of abdomen, excessive nausea/vomiting, excessive bright red bleeding from rectum  Medications:  continue your home medications. Keep a list of your home medications at all times for emergencies.

## 2024-10-11 NOTE — ANESTHESIA POSTPROCEDURE EVALUATION
Anesthesia Post Evaluation    Patient: Naye Aguilar    Procedure(s) Performed: * No procedures listed *    Final Anesthesia Type: MAC      Patient location during evaluation: OPS  Patient participation: Yes- Able to Participate  Level of consciousness: awake and alert, awake and oriented  Post-procedure vital signs: reviewed and stable  Pain management: adequate  Airway patency: patent    PONV status at discharge: No PONV  Anesthetic complications: no      Cardiovascular status: blood pressure returned to baseline  Respiratory status: unassisted, room air and spontaneous ventilation  Hydration status: euvolemic  Follow-up not needed.              Vitals Value Taken Time   /97 10/11/24 1000   Temp 37.1 °C (98.7 °F) 10/11/24 1000   Pulse 101 10/11/24 1000   Resp 20 10/11/24 1000   SpO2 97 % 10/11/24 1000         No case tracking events are documented in the log.      Pain/Tahmina Score: No data recorded

## 2024-10-14 ENCOUNTER — TELEPHONE (OUTPATIENT)
Dept: OBSTETRICS AND GYNECOLOGY | Facility: CLINIC | Age: 59
End: 2024-10-14
Payer: MEDICAID

## 2024-10-14 VITALS
TEMPERATURE: 97 F | WEIGHT: 134.94 LBS | BODY MASS INDEX: 26.35 KG/M2 | DIASTOLIC BLOOD PRESSURE: 63 MMHG | HEART RATE: 74 BPM | OXYGEN SATURATION: 100 % | RESPIRATION RATE: 18 BRPM | SYSTOLIC BLOOD PRESSURE: 139 MMHG

## 2024-10-14 DIAGNOSIS — N81.3 UTERINE PROCIDENTIA: Primary | ICD-10-CM

## 2024-10-14 RX ORDER — FAMOTIDINE 20 MG/1
20 TABLET, FILM COATED ORAL
Status: CANCELLED | OUTPATIENT
Start: 2024-10-14

## 2024-10-14 RX ORDER — CEFAZOLIN SODIUM 2 G/50ML
2 SOLUTION INTRAVENOUS
Status: CANCELLED | OUTPATIENT
Start: 2024-10-14

## 2024-10-14 RX ORDER — SODIUM CHLORIDE 9 MG/ML
INJECTION, SOLUTION INTRAVENOUS CONTINUOUS
Status: CANCELLED | OUTPATIENT
Start: 2024-10-14

## 2024-10-14 RX ORDER — MUPIROCIN 20 MG/G
OINTMENT TOPICAL
Status: CANCELLED | OUTPATIENT
Start: 2024-10-14

## 2024-10-14 NOTE — TELEPHONE ENCOUNTER
----- Message from Beata sent at 10/14/2024  8:41 AM CDT -----  Regarding: Call Back  Type:  Patient Returning Call    Who Called:  Who Left Message for Patient:  Does the patient know what this is regarding?:  Would the patient rather a call back or a response via [facility]?   Best Call Back Number:[phone]  Additional Information: Pt is asking when she needs to come in for a pre op because she had colonoscopy done.

## 2024-10-14 NOTE — TELEPHONE ENCOUNTER
Spoke with Dr Freedman, please inform pt Dr Freedman will be calling pt to discuss nation wide shortage of IV Fluids and potential interference with surgical cases and plan moving forward.

## 2024-10-15 ENCOUNTER — OFFICE VISIT (OUTPATIENT)
Dept: OBSTETRICS AND GYNECOLOGY | Facility: CLINIC | Age: 59
End: 2024-10-15
Payer: MEDICAID

## 2024-10-15 VITALS — SYSTOLIC BLOOD PRESSURE: 138 MMHG | DIASTOLIC BLOOD PRESSURE: 88 MMHG | BODY MASS INDEX: 25.97 KG/M2 | WEIGHT: 133 LBS

## 2024-10-15 DIAGNOSIS — R94.31 PROLONGED Q-T INTERVAL ON ECG: ICD-10-CM

## 2024-10-15 DIAGNOSIS — N81.3 UTERINE PROCIDENTIA: Primary | ICD-10-CM

## 2024-10-15 DIAGNOSIS — N95.2 VAGINAL ATROPHY: ICD-10-CM

## 2024-10-15 DIAGNOSIS — F17.200 TOBACCO DEPENDENCE: ICD-10-CM

## 2024-10-15 PROCEDURE — 1159F MED LIST DOCD IN RCRD: CPT | Mod: CPTII,,, | Performed by: OBSTETRICS & GYNECOLOGY

## 2024-10-15 PROCEDURE — 3075F SYST BP GE 130 - 139MM HG: CPT | Mod: CPTII,,, | Performed by: OBSTETRICS & GYNECOLOGY

## 2024-10-15 PROCEDURE — 3079F DIAST BP 80-89 MM HG: CPT | Mod: CPTII,,, | Performed by: OBSTETRICS & GYNECOLOGY

## 2024-10-15 PROCEDURE — 99214 OFFICE O/P EST MOD 30 MIN: CPT | Mod: ,,, | Performed by: OBSTETRICS & GYNECOLOGY

## 2024-10-15 PROCEDURE — 3008F BODY MASS INDEX DOCD: CPT | Mod: CPTII,,, | Performed by: OBSTETRICS & GYNECOLOGY

## 2024-10-15 NOTE — H&P (VIEW-ONLY)
"History & Physical    Subjective     History of Present Illness:  Patient is a 58 y.o. female  with uterine procidentia here today to pre admit for surgical repair. Previously scheduled for vaginal hysterectomy with A&P repair sacrospinous suspension axis dermis graft placement on 10/10/24, postponed due to positive Cologuard. Colonoscopy WNL with Dr Gabriel 10/11.  She was had prolonged QT interval noted on her EKG preoperatively.  She has a echocardiogram scheduled for this Friday in his not yet been cleared by Cardiology for surgery.    Gyn History:    Menstrual History  Cycle: No  Menarche Age: 14 years  No Cycle Reason: Medical ()    Menopause  Menopause Age: 1 years  Post Menopausal Bleeding: No  Hormone Replacement Therapy: No    Pap History  Last pap date: 24 (unsatafacory PAP with negative HPV)  History of Abnormal Pap: No  HPV Vaccine Completed: No    [place]  Sexually Active: No  STI History: No  Contraception: No    Breast History  Last Breast Imaging Date: Yes  Date: 10/06/24 (benign)  History of Abnormal Breast Imaging : No  History of Breast Biopsy: No    Per previous 9/10/24:  Per previous note 24:  Patient is a 58 y.o.  presents today for evaluation of vaginal prolapse.  Symptoms began about one year ago but if worsened significantly in the last few weeks. C/o intense pressure, pain from prolapse. States, "I am unable to push it back inside". Seen in ER 24 for complaints of worsening vaginal prolapse, difficulty reducing. Prolapse reduced in ER.      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  There is an ectropion of the cervix.  A Pap was obtained.  There is mild-to-moderate atrophy likely worsened in her appearance by exposure of the vaginal epithelium to air.  Prolapse was reduced.    -Adnexa normal nontender      Review of patient's allergies indicates:  No Known Allergies    Current Outpatient Medications "   Medication Sig Dispense Refill    conjugated estrogens (PREMARIN) vaginal cream Place 0.5 g vaginally once daily. 1 applicator 5    hydroCHLOROthiazide (HYDRODIURIL) 12.5 MG Tab Take 12.5 mg by mouth once daily.       No current facility-administered medications for this visit.       Past Medical History:   Diagnosis Date    Hypertension      Past Surgical History:   Procedure Laterality Date    ECTOPIC PREGNANCY SURGERY  1992    TUBAL LIGATION  1995    Dr. Dayo Banegas     Family History   Problem Relation Name Age of Onset    Breast cancer Maternal Grandmother          onset unknown    Breast cancer Other MGA         onset unknown    Cervical cancer Neg Hx      Colon cancer Neg Hx      Ovarian cancer Neg Hx      Uterine cancer Neg Hx       Social History     Tobacco Use    Smoking status: Every Day     Current packs/day: 1.00     Average packs/day: 1 pack/day for 45.8 years (45.8 ttl pk-yrs)     Types: Cigarettes     Start date: 1/1/1979    Smokeless tobacco: Never   Substance Use Topics    Alcohol use: Not Currently     Comment: on ocassion    Drug use: Yes     Types: Marijuana     Comment: 3 TIMES PER WEEK        Review of Systems:  Review of Systems   Respiratory: Negative.     Cardiovascular: Negative.    Gastrointestinal: Negative.    Genitourinary:         Prolapse           Objective     Vital Signs (Most Recent)  BP: 138/88 (10/15/24 0802)     60.3 kg (133 lb)     Physical Exam:  Physical Exam    Physical Exam  Gen: NAD  Cardio: RRR  Lungs CTA b/l  Abd: Soft, NT  Ext: no CCE         Assessment and Plan   1. Uterine procidentia    2. Prolonged Q-T interval on ECG    3. Tobacco dependence    4. Vaginal atrophy        PLAN:    Colonoscopy WNL     Again we reviewed the topic of repair similar to previous conversation.  Discussed the exam findings with her.  We discussed procidentia of the uterus.  Generally in young active, sexually active women hysterectomy with repair of the vaginal vault prolapse would  be performed.  The ideal surgery the laparoscopic her abdominal sacral colpopexy given her age and extensive prolapse.  She does not like the idea of a pelvic mesh at this time.  We also discussed the option for vaginal or laparoscopic hysterectomy with anterior-posterior repair sacrospinous ligament fixation axis dermis graft placement.  Discussed the placement of a category epic graft to help bolster wound healing and decrease likelihood of recurrent prolapse in the future.  We did discuss the risk of recurrent prolapse in the future.     Continue vaginal premarin cream      She prefers vaginal hysterectomy with A&P repair sacrospinous suspension axis dermis graft placement  We will need to follow up cardiac clearance prior to surgery.    We discussed the current IV fluid shortage and limited availability of IV fluids which would be necessary during the procedure as well as for colposcopy after the procedure.  Given the severity of her prolapse I believe surgery is warranted however we will have to await until we receive cardiac clearance and confirmed date with the hospital prior to scheduling    Consent given, NPO after midnight prior to procedure      RTC post op

## 2024-10-15 NOTE — PROGRESS NOTES
"History & Physical    Subjective     History of Present Illness:  Patient is a 58 y.o. female  with uterine procidentia here today to pre admit for surgical repair. Previously scheduled for vaginal hysterectomy with A&P repair sacrospinous suspension axis dermis graft placement on 10/10/24, postponed due to positive Cologuard. Colonoscopy WNL with Dr Gabriel 10/11.  She was had prolonged QT interval noted on her EKG preoperatively.  She has a echocardiogram scheduled for this Friday in his not yet been cleared by Cardiology for surgery.    Gyn History:    Menstrual History  Cycle: No  Menarche Age: 14 years  No Cycle Reason: Medical ()    Menopause  Menopause Age: 1 years  Post Menopausal Bleeding: No  Hormone Replacement Therapy: No    Pap History  Last pap date: 24 (unsatafacory PAP with negative HPV)  History of Abnormal Pap: No  HPV Vaccine Completed: No    [place]  Sexually Active: No  STI History: No  Contraception: No    Breast History  Last Breast Imaging Date: Yes  Date: 10/06/24 (benign)  History of Abnormal Breast Imaging : No  History of Breast Biopsy: No    Per previous 9/10/24:  Per previous note 24:  Patient is a 58 y.o.  presents today for evaluation of vaginal prolapse.  Symptoms began about one year ago but if worsened significantly in the last few weeks. C/o intense pressure, pain from prolapse. States, "I am unable to push it back inside". Seen in ER 24 for complaints of worsening vaginal prolapse, difficulty reducing. Prolapse reduced in ER.      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  There is an ectropion of the cervix.  A Pap was obtained.  There is mild-to-moderate atrophy likely worsened in her appearance by exposure of the vaginal epithelium to air.  Prolapse was reduced.    -Adnexa normal nontender      Review of patient's allergies indicates:  No Known Allergies    Current Outpatient Medications "   Medication Sig Dispense Refill    conjugated estrogens (PREMARIN) vaginal cream Place 0.5 g vaginally once daily. 1 applicator 5    hydroCHLOROthiazide (HYDRODIURIL) 12.5 MG Tab Take 12.5 mg by mouth once daily.       No current facility-administered medications for this visit.       Past Medical History:   Diagnosis Date    Hypertension      Past Surgical History:   Procedure Laterality Date    ECTOPIC PREGNANCY SURGERY  1992    TUBAL LIGATION  1995    Dr. Dayo Banegas     Family History   Problem Relation Name Age of Onset    Breast cancer Maternal Grandmother          onset unknown    Breast cancer Other MGA         onset unknown    Cervical cancer Neg Hx      Colon cancer Neg Hx      Ovarian cancer Neg Hx      Uterine cancer Neg Hx       Social History     Tobacco Use    Smoking status: Every Day     Current packs/day: 1.00     Average packs/day: 1 pack/day for 45.8 years (45.8 ttl pk-yrs)     Types: Cigarettes     Start date: 1/1/1979    Smokeless tobacco: Never   Substance Use Topics    Alcohol use: Not Currently     Comment: on ocassion    Drug use: Yes     Types: Marijuana     Comment: 3 TIMES PER WEEK        Review of Systems:  Review of Systems   Respiratory: Negative.     Cardiovascular: Negative.    Gastrointestinal: Negative.    Genitourinary:         Prolapse           Objective     Vital Signs (Most Recent)  BP: 138/88 (10/15/24 0802)     60.3 kg (133 lb)     Physical Exam:  Physical Exam    Physical Exam  Gen: NAD  Cardio: RRR  Lungs CTA b/l  Abd: Soft, NT  Ext: no CCE         Assessment and Plan   1. Uterine procidentia    2. Prolonged Q-T interval on ECG    3. Tobacco dependence    4. Vaginal atrophy        PLAN:    Colonoscopy WNL     Again we reviewed the topic of repair similar to previous conversation.  Discussed the exam findings with her.  We discussed procidentia of the uterus.  Generally in young active, sexually active women hysterectomy with repair of the vaginal vault prolapse would  be performed.  The ideal surgery the laparoscopic her abdominal sacral colpopexy given her age and extensive prolapse.  She does not like the idea of a pelvic mesh at this time.  We also discussed the option for vaginal or laparoscopic hysterectomy with anterior-posterior repair sacrospinous ligament fixation axis dermis graft placement.  Discussed the placement of a category epic graft to help bolster wound healing and decrease likelihood of recurrent prolapse in the future.  We did discuss the risk of recurrent prolapse in the future.     Continue vaginal premarin cream      She prefers vaginal hysterectomy with A&P repair sacrospinous suspension axis dermis graft placement  We will need to follow up cardiac clearance prior to surgery.    We discussed the current IV fluid shortage and limited availability of IV fluids which would be necessary during the procedure as well as for colposcopy after the procedure.  Given the severity of her prolapse I believe surgery is warranted however we will have to await until we receive cardiac clearance and confirmed date with the hospital prior to scheduling    Consent given, NPO after midnight prior to procedure      RTC post op

## 2024-10-16 PROBLEM — R94.31 PROLONGED Q-T INTERVAL ON ECG: Status: ACTIVE | Noted: 2024-10-16

## 2024-10-17 LAB — BEAKER SEE SCANNED REPORT: NORMAL

## 2024-10-23 ENCOUNTER — TELEPHONE (OUTPATIENT)
Dept: OBSTETRICS AND GYNECOLOGY | Facility: CLINIC | Age: 59
End: 2024-10-23
Payer: MEDICAID

## 2024-10-23 DIAGNOSIS — N81.3 UTERINE PROCIDENTIA: Primary | ICD-10-CM

## 2024-10-23 RX ORDER — CEFAZOLIN SODIUM 2 G/50ML
2 SOLUTION INTRAVENOUS
OUTPATIENT
Start: 2024-10-23

## 2024-10-23 RX ORDER — SODIUM CHLORIDE 9 MG/ML
INJECTION, SOLUTION INTRAVENOUS CONTINUOUS
OUTPATIENT
Start: 2024-10-23

## 2024-10-23 RX ORDER — FAMOTIDINE 20 MG/1
20 TABLET, FILM COATED ORAL
OUTPATIENT
Start: 2024-10-23

## 2024-10-23 RX ORDER — MUPIROCIN 20 MG/G
OINTMENT TOPICAL
OUTPATIENT
Start: 2024-10-23

## 2024-10-23 NOTE — PROGRESS NOTES
Cardiac clearance received.    I have scheduled her for TVH, anterior-posterior colporrhaphy, sacrospinous ligament suspension, axis dermis graft placement on 11/06/2024.    As stated in the H and P given the severity of her symptoms and interference with the ability to work and tend to activities of daily living recommend proceeding with surgery despite current IV fluid shortage.

## 2024-10-23 NOTE — TELEPHONE ENCOUNTER
Cardio clearance in chart.     Per Dr. Banegas, we will proceed with surgery on 11/06/2024 as medically urgent/necessary. With this being said, even though we are labeling this surgery as this, it still has to be reviewed by an outside party to confirm necessity. Currently, we are unable to operate unless medically urgent/necessary due to the nation wide IV fluid shortage.    Until we hear different, we are to proceed with surgery on 11/06/2024. I let her know that if something were to change, we will contact her.     I also informed her that she needs to go to the hospital either tomorrow or Monday and get her pre-admit done.     The patient agrees with plan of care and verbalized a clear understanding.     I called the patient to relay Dr. Banegas's message.     She asked if there is any way to do the surgery sooner. I let her know that unfortunately, due to scheduling, he doesn't have any sooner availability.     She stated that she was in a lot of pain and asked what can she do in the mean time. Per Dr. Banegas, she can take a clean glove with premarin cream and push everything back in to get relief or try a pessary but we may have to order one to fit her which can take a few weeks.     The patient stated that she will try the glove with premarin cream.    The patient also stated that on Monday she went to urinate and she had blood that came out, no urine. It only happened that one time.     I let her know that this may just be irritation/inflammation to the area. Monitor this for the next few days. If bleeding occurs again, call the office. If UTI symptoms (explained to patient) she can come into the office to be evaluated.     She agrees with the plan of care and verbalized a clear understanding.

## 2024-10-23 NOTE — TELEPHONE ENCOUNTER
----- Message from Brylee sent at 10/23/2024  8:36 AM CDT -----  Regarding: Pt Advice  Contact: Naye Daigle called to let us know that she is cleared for surgery. The records were faxed over to us yesterday. Patient call back number [phone].

## 2024-10-28 ENCOUNTER — HOSPITAL ENCOUNTER (OUTPATIENT)
Dept: PREADMISSION TESTING | Facility: HOSPITAL | Age: 59
Discharge: HOME | End: 2024-10-28
Attending: OBSTETRICS & GYNECOLOGY
Payer: MEDICAID

## 2024-10-28 VITALS — BODY MASS INDEX: 26.1 KG/M2 | HEIGHT: 60 IN | WEIGHT: 132.94 LBS

## 2024-10-28 DIAGNOSIS — N81.3 UTERINE PROCIDENTIA: ICD-10-CM

## 2024-10-28 LAB
ALBUMIN SERPL-MCNC: 4.4 G/DL (ref 3.4–5)
ALBUMIN/GLOB SERPL: 1.6 RATIO
ALP SERPL-CCNC: 64 UNIT/L (ref 50–144)
ALT SERPL-CCNC: 28 UNIT/L (ref 1–45)
ANION GAP SERPL CALC-SCNC: 7 MEQ/L (ref 2–13)
AST SERPL-CCNC: 31 UNIT/L (ref 14–36)
BACTERIA #/AREA URNS AUTO: ABNORMAL /HPF
BASOPHILS # BLD AUTO: 0.09 X10(3)/MCL (ref 0.01–0.08)
BASOPHILS NFR BLD AUTO: 1 % (ref 0.1–1.2)
BILIRUB SERPL-MCNC: 0.2 MG/DL (ref 0–1)
BILIRUB UR QL STRIP.AUTO: NEGATIVE
BUN SERPL-MCNC: 38 MG/DL (ref 7–20)
CALCIUM SERPL-MCNC: 10.1 MG/DL (ref 8.4–10.2)
CHLORIDE SERPL-SCNC: 100 MMOL/L (ref 98–110)
CLARITY UR: CLEAR
CO2 SERPL-SCNC: 31 MMOL/L (ref 21–32)
COLOR UR AUTO: YELLOW
CREAT SERPL-MCNC: 1.24 MG/DL (ref 0.66–1.25)
CREAT/UREA NIT SERPL: 31 (ref 12–20)
EOSINOPHIL # BLD AUTO: 0.57 X10(3)/MCL (ref 0.04–0.36)
EOSINOPHIL NFR BLD AUTO: 6.1 % (ref 0.7–7)
ERYTHROCYTE [DISTWIDTH] IN BLOOD BY AUTOMATED COUNT: 13 % (ref 11–14.5)
GFR SERPLBLD CREATININE-BSD FMLA CKD-EPI: 51 ML/MIN/1.73/M2
GLOBULIN SER-MCNC: 2.7 GM/DL (ref 2–3.9)
GLUCOSE SERPL-MCNC: 103 MG/DL (ref 70–115)
GLUCOSE UR QL STRIP: NEGATIVE
HCT VFR BLD AUTO: 37 % (ref 36–48)
HGB BLD-MCNC: 12.4 G/DL (ref 11.8–16)
HGB UR QL STRIP: ABNORMAL
IMM GRANULOCYTES # BLD AUTO: 0.03 X10(3)/MCL (ref 0–0.03)
IMM GRANULOCYTES NFR BLD AUTO: 0.3 % (ref 0–0.5)
KETONES UR QL STRIP: NEGATIVE
LEUKOCYTE ESTERASE UR QL STRIP: NEGATIVE
LYMPHOCYTES # BLD AUTO: 2.98 X10(3)/MCL (ref 1.16–3.74)
LYMPHOCYTES NFR BLD AUTO: 32.1 % (ref 20–55)
MCH RBC QN AUTO: 32 PG (ref 27–34)
MCHC RBC AUTO-ENTMCNC: 33.5 G/DL (ref 31–37)
MCV RBC AUTO: 95.4 FL (ref 79–99)
MONOCYTES # BLD AUTO: 0.66 X10(3)/MCL (ref 0.24–0.36)
MONOCYTES NFR BLD AUTO: 7.1 % (ref 4.7–12.5)
NEUTROPHILS # BLD AUTO: 4.96 X10(3)/MCL (ref 1.56–6.13)
NEUTROPHILS NFR BLD AUTO: 53.4 % (ref 37–73)
NITRITE UR QL STRIP: NEGATIVE
NRBC BLD AUTO-RTO: 0 %
PH UR STRIP: 6 [PH]
PLATELET # BLD AUTO: 328 X10(3)/MCL (ref 140–371)
PMV BLD AUTO: 11.1 FL (ref 9.4–12.4)
POTASSIUM SERPL-SCNC: 4.2 MMOL/L (ref 3.5–5.1)
PROT SERPL-MCNC: 7.1 GM/DL (ref 6.3–8.2)
PROT UR QL STRIP: NEGATIVE
RBC # BLD AUTO: 3.88 X10(6)/MCL (ref 4–5.1)
RBC #/AREA URNS AUTO: ABNORMAL /HPF
SODIUM SERPL-SCNC: 138 MMOL/L (ref 136–145)
SP GR UR STRIP.AUTO: 1.01 (ref 1–1.03)
SQUAMOUS #/AREA URNS AUTO: ABNORMAL /HPF
UROBILINOGEN UR STRIP-ACNC: 0.2
WBC # BLD AUTO: 9.29 X10(3)/MCL (ref 4–11.5)
WBC #/AREA URNS AUTO: ABNORMAL /HPF

## 2024-10-28 PROCEDURE — 80053 COMPREHEN METABOLIC PANEL: CPT | Performed by: OBSTETRICS & GYNECOLOGY

## 2024-10-28 PROCEDURE — 81003 URINALYSIS AUTO W/O SCOPE: CPT | Performed by: OBSTETRICS & GYNECOLOGY

## 2024-10-28 PROCEDURE — 85025 COMPLETE CBC W/AUTO DIFF WBC: CPT | Performed by: OBSTETRICS & GYNECOLOGY

## 2024-10-28 PROCEDURE — 87086 URINE CULTURE/COLONY COUNT: CPT | Performed by: OBSTETRICS & GYNECOLOGY

## 2024-10-28 PROCEDURE — 81015 MICROSCOPIC EXAM OF URINE: CPT | Performed by: OBSTETRICS & GYNECOLOGY

## 2024-10-30 LAB — BACTERIA UR CULT: NORMAL

## 2024-11-06 ENCOUNTER — HOSPITAL ENCOUNTER (OUTPATIENT)
Facility: HOSPITAL | Age: 59
Discharge: HOME | End: 2024-11-07
Attending: OBSTETRICS & GYNECOLOGY | Admitting: OBSTETRICS & GYNECOLOGY
Payer: MEDICAID

## 2024-11-06 DIAGNOSIS — Z98.890 S/P CYSTOSCOPY: ICD-10-CM

## 2024-11-06 DIAGNOSIS — Z98.890 S/P ANTERIOR COLPORRHAPHY: ICD-10-CM

## 2024-11-06 DIAGNOSIS — Z90.710 STATUS POST VAGINAL HYSTERECTOMY: Primary | ICD-10-CM

## 2024-11-06 DIAGNOSIS — N81.3 UTERINE PROCIDENTIA: ICD-10-CM

## 2024-11-06 DIAGNOSIS — Z90.710 S/P VAGINAL HYSTERECTOMY: ICD-10-CM

## 2024-11-06 LAB
ABORH RETYPE: NORMAL
GROUP & RH: NORMAL
HCT VFR BLD AUTO: 28.4 % (ref 36–48)
HGB BLD-MCNC: 9.7 G/DL (ref 11.8–16)
INDIRECT COOMBS: NORMAL
SPECIMEN OUTDATE: NORMAL

## 2024-11-06 PROCEDURE — 36000709 HC OR TIME LEV III EA ADD 15 MIN: Performed by: OBSTETRICS & GYNECOLOGY

## 2024-11-06 PROCEDURE — 57282 COLPOPEXY EXTRAPERITONEAL: CPT | Mod: 59,,, | Performed by: OBSTETRICS & GYNECOLOGY

## 2024-11-06 PROCEDURE — 51702 INSERT TEMP BLADDER CATH: CPT

## 2024-11-06 PROCEDURE — 57282 COLPOPEXY EXTRAPERITONEAL: CPT | Mod: AS,59,,

## 2024-11-06 PROCEDURE — 58262 VAG HYST INCLUDING T/O: CPT | Mod: ,,, | Performed by: OBSTETRICS & GYNECOLOGY

## 2024-11-06 PROCEDURE — 57260 CMBN ANT PST COLPRHY: CPT | Mod: 51,,, | Performed by: OBSTETRICS & GYNECOLOGY

## 2024-11-06 PROCEDURE — 25000003 PHARM REV CODE 250

## 2024-11-06 PROCEDURE — 63600175 PHARM REV CODE 636 W HCPCS: Performed by: OBSTETRICS & GYNECOLOGY

## 2024-11-06 PROCEDURE — 63600175 PHARM REV CODE 636 W HCPCS

## 2024-11-06 PROCEDURE — 25000003 PHARM REV CODE 250: Performed by: NURSE ANESTHETIST, CERTIFIED REGISTERED

## 2024-11-06 PROCEDURE — C1781 MESH (IMPLANTABLE): HCPCS | Performed by: OBSTETRICS & GYNECOLOGY

## 2024-11-06 PROCEDURE — 99900031 HC PATIENT EDUCATION (STAT)

## 2024-11-06 PROCEDURE — 25000003 PHARM REV CODE 250: Performed by: OBSTETRICS & GYNECOLOGY

## 2024-11-06 PROCEDURE — 99900035 HC TECH TIME PER 15 MIN (STAT)

## 2024-11-06 PROCEDURE — 94799 UNLISTED PULMONARY SVC/PX: CPT

## 2024-11-06 PROCEDURE — 63600175 PHARM REV CODE 636 W HCPCS: Performed by: NURSE ANESTHETIST, CERTIFIED REGISTERED

## 2024-11-06 PROCEDURE — 71000033 HC RECOVERY, INTIAL HOUR: Performed by: OBSTETRICS & GYNECOLOGY

## 2024-11-06 PROCEDURE — 58262 VAG HYST INCLUDING T/O: CPT | Mod: AS,,,

## 2024-11-06 PROCEDURE — 85018 HEMOGLOBIN: CPT

## 2024-11-06 PROCEDURE — 94761 N-INVAS EAR/PLS OXIMETRY MLT: CPT

## 2024-11-06 PROCEDURE — P9047 ALBUMIN (HUMAN), 25%, 50ML: HCPCS | Mod: JZ,JG | Performed by: NURSE ANESTHETIST, CERTIFIED REGISTERED

## 2024-11-06 PROCEDURE — 57260 CMBN ANT PST COLPRHY: CPT | Mod: AS,51,,

## 2024-11-06 PROCEDURE — 36000708 HC OR TIME LEV III 1ST 15 MIN: Performed by: OBSTETRICS & GYNECOLOGY

## 2024-11-06 PROCEDURE — 36415 COLL VENOUS BLD VENIPUNCTURE: CPT | Performed by: OBSTETRICS & GYNECOLOGY

## 2024-11-06 PROCEDURE — C1713 ANCHOR/SCREW BN/BN,TIS/BN: HCPCS | Performed by: OBSTETRICS & GYNECOLOGY

## 2024-11-06 PROCEDURE — 36415 COLL VENOUS BLD VENIPUNCTURE: CPT | Mod: 91

## 2024-11-06 PROCEDURE — 37000008 HC ANESTHESIA 1ST 15 MINUTES: Performed by: OBSTETRICS & GYNECOLOGY

## 2024-11-06 PROCEDURE — C2631 REP DEV, URINARY, W/O SLING: HCPCS | Performed by: OBSTETRICS & GYNECOLOGY

## 2024-11-06 PROCEDURE — 27201423 OPTIME MED/SURG SUP & DEVICES STERILE SUPPLY: Performed by: OBSTETRICS & GYNECOLOGY

## 2024-11-06 PROCEDURE — 37000009 HC ANESTHESIA EA ADD 15 MINS: Performed by: OBSTETRICS & GYNECOLOGY

## 2024-11-06 PROCEDURE — 86900 BLOOD TYPING SEROLOGIC ABO: CPT | Performed by: OBSTETRICS & GYNECOLOGY

## 2024-11-06 DEVICE — IMPLANTABLE DEVICE: Type: IMPLANTABLE DEVICE | Site: VAGINA | Status: FUNCTIONAL

## 2024-11-06 RX ORDER — DEXAMETHASONE SODIUM PHOSPHATE 4 MG/ML
INJECTION, SOLUTION INTRA-ARTICULAR; INTRALESIONAL; INTRAMUSCULAR; INTRAVENOUS; SOFT TISSUE
Status: DISCONTINUED | OUTPATIENT
Start: 2024-11-06 | End: 2024-11-06

## 2024-11-06 RX ORDER — ONDANSETRON 4 MG/1
8 TABLET, ORALLY DISINTEGRATING ORAL EVERY 8 HOURS PRN
Status: DISCONTINUED | OUTPATIENT
Start: 2024-11-06 | End: 2024-11-07 | Stop reason: HOSPADM

## 2024-11-06 RX ORDER — OXYCODONE AND ACETAMINOPHEN 5; 325 MG/1; MG/1
1 TABLET ORAL EVERY 4 HOURS PRN
Status: DISCONTINUED | OUTPATIENT
Start: 2024-11-06 | End: 2024-11-07 | Stop reason: HOSPADM

## 2024-11-06 RX ORDER — DOCUSATE SODIUM 100 MG/1
100 CAPSULE, LIQUID FILLED ORAL 2 TIMES DAILY
Status: DISCONTINUED | OUTPATIENT
Start: 2024-11-06 | End: 2024-11-07 | Stop reason: HOSPADM

## 2024-11-06 RX ORDER — MUPIROCIN 20 MG/G
OINTMENT TOPICAL 2 TIMES DAILY
Status: DISCONTINUED | OUTPATIENT
Start: 2024-11-06 | End: 2024-11-07 | Stop reason: HOSPADM

## 2024-11-06 RX ORDER — DIPHENHYDRAMINE HCL 25 MG
25 CAPSULE ORAL EVERY 4 HOURS PRN
Status: DISCONTINUED | OUTPATIENT
Start: 2024-11-06 | End: 2024-11-07 | Stop reason: HOSPADM

## 2024-11-06 RX ORDER — PROPOFOL 10 MG/ML
VIAL (ML) INTRAVENOUS
Status: DISCONTINUED | OUTPATIENT
Start: 2024-11-06 | End: 2024-11-06

## 2024-11-06 RX ORDER — OXYCODONE AND ACETAMINOPHEN 10; 325 MG/1; MG/1
1 TABLET ORAL EVERY 4 HOURS PRN
Status: DISCONTINUED | OUTPATIENT
Start: 2024-11-06 | End: 2024-11-07 | Stop reason: HOSPADM

## 2024-11-06 RX ORDER — LIDOCAINE HYDROCHLORIDE AND EPINEPHRINE 10; 10 UG/ML; MG/ML
INJECTION, SOLUTION INFILTRATION; PERINEURAL
Status: DISCONTINUED | OUTPATIENT
Start: 2024-11-06 | End: 2024-11-06 | Stop reason: HOSPADM

## 2024-11-06 RX ORDER — ACETAMINOPHEN 10 MG/ML
INJECTION, SOLUTION INTRAVENOUS
Status: DISCONTINUED | OUTPATIENT
Start: 2024-11-06 | End: 2024-11-06

## 2024-11-06 RX ORDER — EPHEDRINE SULFATE 50 MG/ML
INJECTION, SOLUTION INTRAVENOUS
Status: DISCONTINUED | OUTPATIENT
Start: 2024-11-06 | End: 2024-11-06

## 2024-11-06 RX ORDER — BISACODYL 10 MG/1
10 SUPPOSITORY RECTAL DAILY PRN
Status: DISCONTINUED | OUTPATIENT
Start: 2024-11-06 | End: 2024-11-07 | Stop reason: HOSPADM

## 2024-11-06 RX ORDER — ALBUMIN HUMAN 250 G/1000ML
SOLUTION INTRAVENOUS
Status: DISCONTINUED | OUTPATIENT
Start: 2024-11-06 | End: 2024-11-06

## 2024-11-06 RX ORDER — LIDOCAINE HYDROCHLORIDE 20 MG/ML
INJECTION INTRAVENOUS
Status: DISCONTINUED | OUTPATIENT
Start: 2024-11-06 | End: 2024-11-06

## 2024-11-06 RX ORDER — IBUPROFEN 600 MG/1
600 TABLET ORAL EVERY 6 HOURS
Status: DISCONTINUED | OUTPATIENT
Start: 2024-11-07 | End: 2024-11-07 | Stop reason: HOSPADM

## 2024-11-06 RX ORDER — FAMOTIDINE 20 MG/1
20 TABLET, FILM COATED ORAL 2 TIMES DAILY
Status: DISCONTINUED | OUTPATIENT
Start: 2024-11-06 | End: 2024-11-07 | Stop reason: HOSPADM

## 2024-11-06 RX ORDER — FAMOTIDINE 20 MG/1
20 TABLET, FILM COATED ORAL
Status: COMPLETED | OUTPATIENT
Start: 2024-11-06 | End: 2024-11-06

## 2024-11-06 RX ORDER — HYDROMORPHONE HYDROCHLORIDE 1 MG/ML
INJECTION, SOLUTION INTRAMUSCULAR; INTRAVENOUS; SUBCUTANEOUS
Status: DISCONTINUED | OUTPATIENT
Start: 2024-11-06 | End: 2024-11-06

## 2024-11-06 RX ORDER — GLYCOPYRROLATE 0.2 MG/ML
0.2 INJECTION INTRAMUSCULAR; INTRAVENOUS
Status: COMPLETED | OUTPATIENT
Start: 2024-11-06 | End: 2024-11-06

## 2024-11-06 RX ORDER — PHENYLEPHRINE HYDROCHLORIDE 10 MG/ML
INJECTION INTRAVENOUS
Status: DISCONTINUED | OUTPATIENT
Start: 2024-11-06 | End: 2024-11-06

## 2024-11-06 RX ORDER — BUPIVACAINE HYDROCHLORIDE AND EPINEPHRINE 5; 5 MG/ML; UG/ML
INJECTION, SOLUTION EPIDURAL; INTRACAUDAL; PERINEURAL
Status: DISCONTINUED | OUTPATIENT
Start: 2024-11-06 | End: 2024-11-06 | Stop reason: HOSPADM

## 2024-11-06 RX ORDER — DOCUSATE SODIUM 100 MG
300 CAPSULE ORAL
Status: DISCONTINUED | OUTPATIENT
Start: 2024-11-06 | End: 2024-11-07 | Stop reason: HOSPADM

## 2024-11-06 RX ORDER — HYDROMORPHONE HYDROCHLORIDE 1 MG/ML
1 INJECTION, SOLUTION INTRAMUSCULAR; INTRAVENOUS; SUBCUTANEOUS EVERY 6 HOURS PRN
Status: DISCONTINUED | OUTPATIENT
Start: 2024-11-06 | End: 2024-11-07 | Stop reason: HOSPADM

## 2024-11-06 RX ORDER — MIDAZOLAM HYDROCHLORIDE 1 MG/ML
2 INJECTION INTRAMUSCULAR; INTRAVENOUS
Status: COMPLETED | OUTPATIENT
Start: 2024-11-06 | End: 2024-11-06

## 2024-11-06 RX ORDER — DIPHENHYDRAMINE HYDROCHLORIDE 50 MG/ML
25 INJECTION INTRAMUSCULAR; INTRAVENOUS EVERY 4 HOURS PRN
Status: DISCONTINUED | OUTPATIENT
Start: 2024-11-06 | End: 2024-11-07 | Stop reason: HOSPADM

## 2024-11-06 RX ORDER — MUPIROCIN 20 MG/G
OINTMENT TOPICAL
Status: DISCONTINUED | OUTPATIENT
Start: 2024-11-06 | End: 2024-11-06 | Stop reason: HOSPADM

## 2024-11-06 RX ORDER — ONDANSETRON HYDROCHLORIDE 2 MG/ML
INJECTION, SOLUTION INTRAVENOUS
Status: DISCONTINUED | OUTPATIENT
Start: 2024-11-06 | End: 2024-11-06

## 2024-11-06 RX ORDER — KETOROLAC TROMETHAMINE 30 MG/ML
30 INJECTION, SOLUTION INTRAMUSCULAR; INTRAVENOUS EVERY 8 HOURS
Status: COMPLETED | OUTPATIENT
Start: 2024-11-06 | End: 2024-11-07

## 2024-11-06 RX ORDER — ROCURONIUM BROMIDE 10 MG/ML
INJECTION, SOLUTION INTRAVENOUS
Status: DISCONTINUED | OUTPATIENT
Start: 2024-11-06 | End: 2024-11-06

## 2024-11-06 RX ORDER — FAMOTIDINE 20 MG/1
20 TABLET, FILM COATED ORAL
Status: DISCONTINUED | OUTPATIENT
Start: 2024-11-06 | End: 2024-11-06

## 2024-11-06 RX ORDER — GLYCOPYRROLATE 0.2 MG/ML
INJECTION INTRAMUSCULAR; INTRAVENOUS
Status: DISCONTINUED | OUTPATIENT
Start: 2024-11-06 | End: 2024-11-06

## 2024-11-06 RX ORDER — SODIUM CHLORIDE 9 MG/ML
INJECTION, SOLUTION INTRAVENOUS CONTINUOUS
Status: DISCONTINUED | OUTPATIENT
Start: 2024-11-06 | End: 2024-11-06

## 2024-11-06 RX ORDER — CEFAZOLIN 2 G/1
2 INJECTION, POWDER, FOR SOLUTION INTRAMUSCULAR; INTRAVENOUS
Status: COMPLETED | OUTPATIENT
Start: 2024-11-06 | End: 2024-11-06

## 2024-11-06 RX ORDER — SODIUM CHLORIDE 0.9 G/100ML
IRRIGANT IRRIGATION
Status: DISCONTINUED | OUTPATIENT
Start: 2024-11-06 | End: 2024-11-06 | Stop reason: HOSPADM

## 2024-11-06 RX ADMIN — PHENYLEPHRINE HYDROCHLORIDE 100 MCG: 10 INJECTION, SOLUTION INTRAVENOUS at 10:11

## 2024-11-06 RX ADMIN — EPHEDRINE SULFATE 15 MG: 50 INJECTION, SOLUTION INTRAVENOUS at 09:11

## 2024-11-06 RX ADMIN — ROCURONIUM BROMIDE 50 MG: 10 INJECTION, SOLUTION INTRAVENOUS at 08:11

## 2024-11-06 RX ADMIN — FAMOTIDINE 20 MG: 20 TABLET, FILM COATED ORAL at 07:11

## 2024-11-06 RX ADMIN — Medication 300 ML: at 03:11

## 2024-11-06 RX ADMIN — GLYCOPYRROLATE 0.2 MG: 0.2 INJECTION INTRAMUSCULAR; INTRAVENOUS at 07:11

## 2024-11-06 RX ADMIN — OXYCODONE HYDROCHLORIDE AND ACETAMINOPHEN 1 TABLET: 10; 325 TABLET ORAL at 07:11

## 2024-11-06 RX ADMIN — ONDANSETRON 4 MG: 2 INJECTION INTRAMUSCULAR; INTRAVENOUS at 08:11

## 2024-11-06 RX ADMIN — ACETAMINOPHEN 1000 MG: 1000 INJECTION, SOLUTION INTRAVENOUS at 10:11

## 2024-11-06 RX ADMIN — Medication 300 ML: at 09:11

## 2024-11-06 RX ADMIN — ALBUMIN (HUMAN) 50 ML: 12.5 SOLUTION INTRAVENOUS at 10:11

## 2024-11-06 RX ADMIN — PROPOFOL 200 MG: 10 INJECTION, EMULSION INTRAVENOUS at 08:11

## 2024-11-06 RX ADMIN — OXYCODONE HYDROCHLORIDE AND ACETAMINOPHEN 1 TABLET: 10; 325 TABLET ORAL at 02:11

## 2024-11-06 RX ADMIN — PHENYLEPHRINE HYDROCHLORIDE 100 MCG: 10 INJECTION, SOLUTION INTRAVENOUS at 11:11

## 2024-11-06 RX ADMIN — EPHEDRINE SULFATE 20 MG: 50 INJECTION, SOLUTION INTRAVENOUS at 09:11

## 2024-11-06 RX ADMIN — CEFAZOLIN 2 G: 2 INJECTION, POWDER, FOR SOLUTION INTRAMUSCULAR; INTRAVENOUS at 09:11

## 2024-11-06 RX ADMIN — HYDROMORPHONE HYDROCHLORIDE 1 MG: 1 INJECTION, SOLUTION INTRAMUSCULAR; INTRAVENOUS; SUBCUTANEOUS at 08:11

## 2024-11-06 RX ADMIN — LIDOCAINE HYDROCHLORIDE 100 MG: 20 INJECTION, SOLUTION INTRAVENOUS at 08:11

## 2024-11-06 RX ADMIN — DOCUSATE SODIUM 100 MG: 100 CAPSULE, LIQUID FILLED ORAL at 09:11

## 2024-11-06 RX ADMIN — SODIUM CHLORIDE: 9 INJECTION, SOLUTION INTRAVENOUS at 10:11

## 2024-11-06 RX ADMIN — GLYCOPYRROLATE 0.2 MG: 0.2 INJECTION INTRAMUSCULAR; INTRAVENOUS at 08:11

## 2024-11-06 RX ADMIN — SODIUM CHLORIDE: 9 INJECTION, SOLUTION INTRAVENOUS at 08:11

## 2024-11-06 RX ADMIN — FAMOTIDINE 20 MG: 20 TABLET, FILM COATED ORAL at 09:11

## 2024-11-06 RX ADMIN — MIDAZOLAM HYDROCHLORIDE 2 MG: 1 INJECTION, SOLUTION INTRAMUSCULAR; INTRAVENOUS at 08:11

## 2024-11-06 RX ADMIN — SUGAMMADEX 200 MG: 100 INJECTION, SOLUTION INTRAVENOUS at 11:11

## 2024-11-06 RX ADMIN — DEXAMETHASONE SODIUM PHOSPHATE 8 MG: 4 INJECTION, SOLUTION INTRA-ARTICULAR; INTRALESIONAL; INTRAMUSCULAR; INTRAVENOUS; SOFT TISSUE at 08:11

## 2024-11-06 RX ADMIN — GLYCOPYRROLATE 0.2 MG: 0.2 INJECTION INTRAMUSCULAR; INTRAVENOUS at 09:11

## 2024-11-06 RX ADMIN — KETOROLAC TROMETHAMINE 30 MG: 30 INJECTION, SOLUTION INTRAMUSCULAR at 01:11

## 2024-11-06 RX ADMIN — MUPIROCIN: 20 OINTMENT TOPICAL at 07:11

## 2024-11-06 RX ADMIN — Medication 300 ML: at 01:11

## 2024-11-06 RX ADMIN — KETOROLAC TROMETHAMINE 30 MG: 30 INJECTION, SOLUTION INTRAMUSCULAR at 09:11

## 2024-11-06 NOTE — PLAN OF CARE
Patient AAOx4, VSS. Denies pain, sob, and no distress observed. Meets criteria for transition of care

## 2024-11-06 NOTE — NURSING
Ambulated with patient in whiteside and around nurses station. Tolerated well. No complaints voiced.

## 2024-11-06 NOTE — ANESTHESIA POSTPROCEDURE EVALUATION
Anesthesia Post Evaluation    Patient: Naye Aguilar    Procedure(s) Performed: Procedure(s) (LRB):  COMBINED ANTEROPOSTERIOR COLPORRHAPHY, WITH CYSTOSCOPY (N/A)  HYSTERECTOMY,VAGINAL,WITH SALPINGO-OOPHORECTOMY (Bilateral)  FIXATION, LIGAMENT, SACROSPINOUS (Bilateral)    Final Anesthesia Type: general      Patient location during evaluation: PACU  Patient participation: Yes- Able to Participate  Level of consciousness: awake and alert  Post-procedure vital signs: reviewed and stable  Pain management: adequate  Airway patency: patent  JOSE DANIEL mitigation strategies: Multimodal analgesia  PONV status at discharge: No PONV  Anesthetic complications: no      Cardiovascular status: blood pressure returned to baseline and hemodynamically stable  Respiratory status: unassisted, spontaneous ventilation and room air  Hydration status: euvolemic  Follow-up not needed.              Vitals Value Taken Time   /71 11/06/24 1205   Temp 36.2 °C (97.1 °F) 11/06/24 1205   Pulse 74 11/06/24 1209   Resp 16 11/06/24 1205   SpO2 96 % 11/06/24 1209   Vitals shown include unfiled device data.      Event Time   Out of Recovery 12:10:42         Pain/Tahmina Score: Tahmina Score: 9 (11/6/2024 12:09 PM)

## 2024-11-06 NOTE — ANESTHESIA PROCEDURE NOTES
Intubation    Date/Time: 11/6/2024 8:52 AM    Performed by: Abdiel Ross CRNA  Authorized by: Abdiel Ross CRNA    Intubation:     Induction:  Intravenous    Intubated:  Postinduction    Mask Ventilation:  Easy mask    Attempts:  1    Attempted By:  CRNA    Method of Intubation:  Direct    Blade:  Meyer 2    Laryngeal View Grade: Grade I - full view of cords      Difficult Airway Encountered?: No      Complications:  None    Airway Device:  Oral endotracheal tube    Airway Device Size:  7.0    Style/Cuff Inflation:  Cuffed (inflated to minimal occlusive pressure)    Tube secured:  21    Secured at:  The lips    Placement Verified By:  Capnometry    Complicating Factors:  None    Findings Post-Intubation:  BS equal bilateral and atraumatic/condition of teeth unchanged

## 2024-11-06 NOTE — OP NOTE
DATE OF PROCEDURE: 11/6/2024    PRE-OP DIAGNOSIS:  Uterine procidentia [N81.3]    POST-OP DIAGNOSIS:  Post-Op Diagnosis Codes:     * Uterine procidentia [N81.3]    PROCEDURE:   Transvaginal Hysterectomy  Bilateral Salpingo-oophorectomy  Anterior and Posterior Colporrhaphy  Sacrospinous Ligament Suspension  Axis Dermis Graft Placement  Cystoscopy    SURGEON: Tano Banegas MD    ASSISTANT: Kim Leiva NP    ANESTHESIA: General Endotracheal    PATHOLOGY: Uterus, Fallopian tubes, Ovaries, Cervix    ESTIMATED BLOOD LOSS:  25 cc    PROCEDURE IN DETAIL:  After consents were reviewed, patient was taken to the operating room where a time-out was held.  She was placed on the OR table, and after induction of heavy sedation, placed in the dorsal lithotomy position in Arturo stirrups and prepped and draped in standard sterile fashion.      A weighted speculum was placed in the posterior vagina.  The cervix was identified and grasped with triple tooth Dory tenaculums, anteriorly and posteriorly.  The cervix was then injected with 0.5% Marcaine with epinephrine, circumferentially.  A scalpel was used to make an incision around the cervix at the cervical vaginal junction.  The bladder was dissected from the cervix anteriorly and deflected anteriorly with a right angle retractor.  The posterior cul-de-sac was entered sharply with Montes scissors.  The posterior peritoneum was tagged to the posterior vagina with an 0 Vicryl suture.  A curved Justin clamp was placed on the left uterosacral ligament.  This pedicle was cut and secured with 0 Vicryl suture and tagged with a hemostat.  The right uterosacral ligament was taken, similarly.  A long weighted speculum was then placed posteriorly to deflect the rectum.  Enseal device was used to take subsequent bits up the cardinal ligaments, uterine arteries, and broad ligaments bilaterally.  Once reaching the level of the tubal insertion and round ligaments, curve Justin clamps were placed x 2  around each remaining pedicle. The uterus was excised and handed off.  The pedicles were then secures with free-ties followed by fore-and-aft sutures, each of 0 Vicryl. The left Fallopian tube was grasped with a Jose clamp and removed with Enseal device.  Right Fallopian tube was removed, similarly.  Ovaries were small and atrophic appearing and were readily available for excision.  They were removed with Enseal device with good hemostasis.  All pedicles were inspected and were hemostatic.      The peritoneum was closed with a pursestring suture of 0 Vicryl, approximating the uterosacral ligaments and obliterating the cul-de-sac.  The vaginal cuff was then closed with 0-Vicryl sutures with good hemostasis.  The vagina was irrigated.     An Allis clamp was used to grasp the vagina just under the bladder neck.  Two additional Allis clamps were used to grasped vagina on either side of the midline of the anterior vaginal cuff.   The anterior vagina was hydro dissected from the underlying bladder with dilute lidocaine with epinephrine.  Metzenbaum scissors were used to undermine the vaginal and incise it in the midline.  Anterior vagina was opened from the cuff to the bladder neck.  Metzenbaum scissors were then used to undermined the vagina, bilaterally, exposing the endopelvic fascia.  Tara plication sutures of 0 Vicryl were used to reapproximate the endopelvic fascia correcting the anterior defect and providing support for the bladder in the midline.      Digital dissection of the pelvic sidewall to the Ischial spines was performed bilaterally, exposed the sacrospinous ligaments on either side.  Coloplast Safron device was used to anchor Gortex suture to the ligaments bilaterally.  Axis Dermis graft was cut to size in a Y fashion and the arms were secured to the Gortex sutures. 2-0 Vicryl was used to secure the graft to the vaginal cuff.  The Gortex sutures were then tied elevating the vaginal cuff.  Sutures were  trimmed.  2-0 Vicryl sutures were used to secure the graft to the distal vagina.  Vagina was then closed with 2 0 Vicryl sutures to the vaginal cuff. Vaginal cuff itself was then fully closed with a 0 Vicryl suture.    The perineal body and posterior vagina were injected with 30 cc dilate lidocaine with epinephrine for hydro dissection.  A scalpel was used to incise and remove a akin shape pieces of skin from the posterior labia, introitus and perineal body.  Metzenbaum scissors was used to undermine and incise the posterior vagina yan the midline from the introitus to 2 cm from the vaginal apex with care to dissection through the full thickness of the posterior vagina. The vagina was then dissected laterally exposing the rectovaginal connective tissue.  2-0 Vicryl Tara plication sutures were placed to correct the rectocele.      The perineal body was reconstructed with interrupted 0 vicryl sutures narrowing the introitus to about 3-4 cm in diameter.  3-0 Vicryl suture was used to close the skin of the perineal body.      A digital rectal exam was performed.  There was no evidence of rectal mucosal injury or obstruction.    A cystoscope was then inserted into the urethra and advanced into the bladder.  Normal urethral mucosa and bladder mucosa was noted and efflux was noted from bilateral ureteral orifices.  Cystoscope was removed bladder was drained.     A Huston catheter was placed. A premarin covered Kerlex was then inserted into the vagina for packing.     Patient was awakened and taken to the recovery room in stable condition having tolerated the procedure very well.  Sponge lap needle count correct.    I agree with anesthesia's plan of care.

## 2024-11-07 VITALS
SYSTOLIC BLOOD PRESSURE: 127 MMHG | TEMPERATURE: 99 F | BODY MASS INDEX: 26.1 KG/M2 | OXYGEN SATURATION: 100 % | WEIGHT: 132.94 LBS | HEART RATE: 88 BPM | DIASTOLIC BLOOD PRESSURE: 72 MMHG | HEIGHT: 60 IN | RESPIRATION RATE: 20 BRPM

## 2024-11-07 PROBLEM — Z90.710 STATUS POST VAGINAL HYSTERECTOMY: Status: ACTIVE | Noted: 2024-11-07

## 2024-11-07 PROBLEM — R33.9 URINARY RETENTION: Status: ACTIVE | Noted: 2024-11-07

## 2024-11-07 LAB
BASOPHILS # BLD AUTO: 0.04 X10(3)/MCL (ref 0.01–0.08)
BASOPHILS NFR BLD AUTO: 0.4 % (ref 0.1–1.2)
EOSINOPHIL # BLD AUTO: 0.04 X10(3)/MCL (ref 0.04–0.36)
EOSINOPHIL NFR BLD AUTO: 0.4 % (ref 0.7–7)
ERYTHROCYTE [DISTWIDTH] IN BLOOD BY AUTOMATED COUNT: 12.7 % (ref 11–14.5)
HCT VFR BLD AUTO: 27.8 % (ref 36–48)
HGB BLD-MCNC: 9.4 G/DL (ref 11.8–16)
IMM GRANULOCYTES # BLD AUTO: 0.04 X10(3)/MCL (ref 0–0.03)
IMM GRANULOCYTES NFR BLD AUTO: 0.4 % (ref 0–0.5)
LYMPHOCYTES # BLD AUTO: 2.24 X10(3)/MCL (ref 1.16–3.74)
LYMPHOCYTES NFR BLD AUTO: 20 % (ref 20–55)
MCH RBC QN AUTO: 31.9 PG (ref 27–34)
MCHC RBC AUTO-ENTMCNC: 33.8 G/DL (ref 31–37)
MCV RBC AUTO: 94.2 FL (ref 79–99)
MONOCYTES # BLD AUTO: 1.23 X10(3)/MCL (ref 0.24–0.36)
MONOCYTES NFR BLD AUTO: 11 % (ref 4.7–12.5)
NEUTROPHILS # BLD AUTO: 7.62 X10(3)/MCL (ref 1.56–6.13)
NEUTROPHILS NFR BLD AUTO: 67.8 % (ref 37–73)
NRBC BLD AUTO-RTO: 0 %
PLATELET # BLD AUTO: 230 X10(3)/MCL (ref 140–371)
PMV BLD AUTO: 11.6 FL (ref 9.4–12.4)
RBC # BLD AUTO: 2.95 X10(6)/MCL (ref 4–5.1)
WBC # BLD AUTO: 11.21 X10(3)/MCL (ref 4–11.5)

## 2024-11-07 PROCEDURE — 99900031 HC PATIENT EDUCATION (STAT)

## 2024-11-07 PROCEDURE — 85025 COMPLETE CBC W/AUTO DIFF WBC: CPT

## 2024-11-07 PROCEDURE — 36415 COLL VENOUS BLD VENIPUNCTURE: CPT

## 2024-11-07 PROCEDURE — 99900035 HC TECH TIME PER 15 MIN (STAT)

## 2024-11-07 PROCEDURE — 94761 N-INVAS EAR/PLS OXIMETRY MLT: CPT

## 2024-11-07 PROCEDURE — 63600175 PHARM REV CODE 636 W HCPCS

## 2024-11-07 PROCEDURE — 51702 INSERT TEMP BLADDER CATH: CPT

## 2024-11-07 PROCEDURE — 25000003 PHARM REV CODE 250

## 2024-11-07 PROCEDURE — 51798 US URINE CAPACITY MEASURE: CPT

## 2024-11-07 RX ORDER — DOCUSATE SODIUM 100 MG/1
100 CAPSULE, LIQUID FILLED ORAL 2 TIMES DAILY
Qty: 30 CAPSULE | Refills: 1 | Status: SHIPPED | OUTPATIENT
Start: 2024-11-07 | End: 2025-01-06

## 2024-11-07 RX ORDER — IBUPROFEN 600 MG/1
600 TABLET ORAL EVERY 6 HOURS PRN
Qty: 120 TABLET | Refills: 2 | Status: SHIPPED | OUTPATIENT
Start: 2024-11-07

## 2024-11-07 RX ORDER — HYDROCODONE BITARTRATE AND ACETAMINOPHEN 7.5; 325 MG/1; MG/1
1 TABLET ORAL EVERY 6 HOURS PRN
Qty: 12 TABLET | Refills: 0 | Status: SHIPPED | OUTPATIENT
Start: 2024-11-07

## 2024-11-07 RX ADMIN — DOCUSATE SODIUM 100 MG: 100 CAPSULE, LIQUID FILLED ORAL at 08:11

## 2024-11-07 RX ADMIN — Medication 300 ML: at 12:11

## 2024-11-07 RX ADMIN — Medication 300 ML: at 05:11

## 2024-11-07 RX ADMIN — FAMOTIDINE 20 MG: 20 TABLET, FILM COATED ORAL at 08:11

## 2024-11-07 RX ADMIN — Medication 300 ML: at 08:11

## 2024-11-07 RX ADMIN — KETOROLAC TROMETHAMINE 30 MG: 30 INJECTION, SOLUTION INTRAMUSCULAR at 05:11

## 2024-11-07 NOTE — PLAN OF CARE
11/07/24 1126   Final Note   Assessment Type Final Discharge Note   Anticipated Discharge Disposition Home   What phone number can be called within the next 1-3 days to see how you are doing after discharge? [phone]   Hospital Resources/Appts/Education Provided Provided patient/caregiver with written discharge plan information   Post-Acute Status   Discharge Delays None known at this time

## 2024-11-07 NOTE — PLAN OF CARE
Problem: Adult Inpatient Plan of Care  Goal: Plan of Care Review  Outcome: Progressing  Goal: Patient-Specific Goal (Individualized)  Outcome: Progressing  Goal: Absence of Hospital-Acquired Illness or Injury  Outcome: Progressing  Goal: Optimal Comfort and Wellbeing  Outcome: Progressing  Intervention: Provide Person-Centered Care  Flowsheets (Taken 11/7/2024 0502)  Trust Relationship/Rapport:   care explained   questions encouraged   questions answered  Goal: Readiness for Transition of Care  Outcome: Progressing     Problem: Infection  Goal: Absence of Infection Signs and Symptoms  Outcome: Progressing     Problem: Fall Injury Risk  Goal: Absence of Fall and Fall-Related Injury  Outcome: Progressing  Intervention: Promote Injury-Free Environment  Flowsheets (Taken 11/7/2024 0502)  Safety Promotion/Fall Prevention:   assistive device/personal item within reach   bed alarm set   instructed to call staff for mobility   lighting adjusted   nonskid shoes/socks when out of bed   side rails raised x 2   supervised activity   Supervised toileting - stay within arms reach     Problem: Pain Acute  Goal: Optimal Pain Control and Function  Outcome: Progressing  Intervention: Develop Pain Management Plan  Flowsheets (Taken 11/7/2024 0502)  Pain Management Interventions: pain management plan reviewed with patient/caregiver  Intervention: Prevent or Manage Pain  Flowsheets (Taken 11/7/2024 0502)  Sleep/Rest Enhancement:   awakenings minimized   consistent schedule promoted  Sensory Stimulation Regulation:   care clustered   lighting decreased   quiet environment promoted  Bowel Elimination Promotion:   ambulation promoted   adequate fluid intake promoted

## 2024-11-07 NOTE — PLAN OF CARE
11/07/24 1006   Discharge Assessment   Assessment Type Discharge Planning Assessment   Confirmed/corrected address, phone number and insurance Yes   Confirmed Demographics Correct on Facesheet   Source of Information patient   Does patient/caregiver understand observation status Yes   Communicated CHRIS with patient/caregiver Yes   Reason For Admission TV[facility]   People in Home spouse   Facility Arrived From: home   Do you expect to return to your current living situation? Yes   Do you have help at home or someone to help you manage your care at home? Yes   Who are your caregiver(s) and their phone number(s)?  Rivera Aguilar [phone]   Prior to hospitilization cognitive status: Alert/Oriented   Current cognitive status: Alert/Oriented   Walking or Climbing Stairs Difficulty no   Dressing/Bathing Difficulty no   Equipment Currently Used at Home none   Readmission within 30 days? No   Patient currently being followed by outpatient case management? No   Do you currently have service(s) that help you manage your care at home? No   Do you take prescription medications? Yes   Do you have prescription coverage? Yes   Coverage HOLDEN   Do you have any problems affording any of your prescribed medications? No   Is the patient taking medications as prescribed? yes   Who is going to help you get home at discharge?    How do you get to doctors appointments? car, drives self;family or friend will provide   Are you on dialysis? No   Do you take coumadin? No   Discharge Plan A Home   Discharge Plan B Home Health   DME Needed Upon Discharge  none   Discharge Plan discussed with: Patient   Transition of Care Barriers None

## 2024-11-07 NOTE — NURSING
Huston removed along with vaginal packing, patient tolerated well, patient encouraged to notify staff of urge to void, educated on monitoring post void residual, patient voices understanding.

## 2024-11-08 ENCOUNTER — CLINICAL SUPPORT (OUTPATIENT)
Dept: OBSTETRICS AND GYNECOLOGY | Facility: CLINIC | Age: 59
End: 2024-11-08
Payer: MEDICAID

## 2024-11-08 VITALS
HEART RATE: 78 BPM | HEIGHT: 60 IN | WEIGHT: 132 LBS | DIASTOLIC BLOOD PRESSURE: 78 MMHG | RESPIRATION RATE: 16 BRPM | SYSTOLIC BLOOD PRESSURE: 124 MMHG | BODY MASS INDEX: 25.91 KG/M2

## 2024-11-08 DIAGNOSIS — Z98.890 POSTOPERATIVE STATE: Primary | ICD-10-CM

## 2024-11-08 RX ORDER — POLYETHYLENE GLYCOL 3350 17 G/17G
POWDER, FOR SOLUTION ORAL
COMMUNITY
Start: 2024-10-09

## 2024-11-08 NOTE — DISCHARGE SUMMARY
"[facility]/Surg  Obstetrics & Gynecology  Discharge Summary    Patient Name: Naye Aguilar  MRN: [id number]  Admission Date: 11/6/2024  Hospital Length of Stay: 0 days  Discharge Date and Time: 11/7/2024  2:10 PM  Attending Physician: No att. providers found   Discharging Provider: BURKE RHODES MD  Primary Care Provider: Kayleen Nix FNP-C    HPI:  58 yo female with uterine procidentia presented for surgical repair.  She underwent TVH, A&P repair, sacrospinous suspension, axis dermis graft placement, cystoscopy on 11/07/2024.    Hospital Course:  Postoperative course was uneventful.  On postop day 1 vaginal packing and Huston catheter were removed.  Patient was able to ambulate and void.  Postvoid residual was 180-200 cc so Huston catheter with leg bag was placed for discharge.  She was otherwise stable and doing very well.  Pain is minimal.  She desires discharge home.    Goals of Care Treatment Preferences:  Code Status: Full Code      Procedure(s) (LRB):  COMBINED ANTEROPOSTERIOR COLPORRHAPHY, WITH CYSTOSCOPY (N/A)  HYSTERECTOMY,VAGINAL,WITH SALPINGO-OOPHORECTOMY (Bilateral)  FIXATION, LIGAMENT, SACROSPINOUS (Bilateral)         Significant Diagnostic Studies: Labs: CMP No results for input(s): "NA", "K", "CL", "CO2", "GLU", "BUN", "CREATININE", "CALCIUM", "PROT", "ALBUMIN", "BILITOT", "ALKPHOS", "AST", "ALT", "ANIONGAP", "ESTGFRAFRICA", "EGFRNONAA" in the last 48 hours., CBC   Recent Labs   Lab 11/06/24  1945 11/07/24  0359   WBC  --  11.21   HGB 9.7* 9.4*   HCT 28.4* 27.8*   PLT  --  230   , and All labs within the past 24 hours have been reviewed      Pending Diagnostic Studies:       Procedure Component Value Units Date/Time    Specimen to Pathology [[id number]] Collected: 11/06/24 0921    Order Status: Sent Lab Status: No result     Specimen: Tissue from Uterus     Specimen to Pathology Gynecology and Obstetrics [[id number]] Collected: 11/06/24 1133    Order Status: Sent Lab Status: No " result     Specimen: Tissue           Final Active Diagnoses:    Diagnosis Date Noted POA    PRINCIPAL PROBLEM:  Status post vaginal hysterectomy [Z90.710] 11/07/2024 Yes    Urinary retention [R33.9] 11/07/2024 No    Uterine procidentia [N81.3] 08/21/2024 Yes      Problems Resolved During this Admission:        Discharged Condition: good    Disposition: Home or Self Care    Follow Up:   Follow-up Information       Tano Banegas MD. Go in 1 day(s).    Specialty: Obstetrics and Gynecology  Why: Voiding trial  Contact information:  [address] [place] [id number] [phone]               Tano Banegas MD Follow up in 6 week(s).    Specialty: Obstetrics and Gynecology  Why: Postop check  Contact information:  [address] [place] [id number] [phone]                           Patient Instructions:   No discharge procedures on file.  Medications:  Reconciled Home Medications:      Medication List        START taking these medications      docusate sodium 100 MG capsule  Commonly known as: COLACE  Take 1 capsule (100 mg total) by mouth 2 (two) times daily.     HYDROcodone-acetaminophen 7.5-325 mg per tablet  Commonly known as: NORCO  Take 1 tablet by mouth every 6 (six) hours as needed for Pain.     ibuprofen 600 MG tablet  Commonly known as: ADVIL,MOTRIN  Take 1 tablet (600 mg total) by mouth every 6 (six) hours as needed for Pain.            CONTINUE taking these medications      conjugated estrogens vaginal cream  Commonly known as: PREMARIN  Place 0.5 g vaginally once daily.     hydroCHLOROthiazide 12.5 MG Tab  Commonly known as: HYDRODIURIL  Take 12.5 mg by mouth once daily.          No lifting more than 15 lbs and no intercourse for 6 weeks.  No driving for 2 weeks and/or while taking narcotic pain medication.  Shower daily with soap and water; otherwise keep wound clean and dry.  Pain, fever, bleeding, and thromboembolism, precautions are given.       TANO BANEGAS MD  Obstetrics &  Gynecology  [facility]/Surg

## 2024-11-08 NOTE — PROGRESS NOTES
Chief Complaint     Follow-up (Here for voiding trial. S/p Transvaginal Hysterectomy, Bilateral Salpingo-oophorectomy, Anterior and Posterior Colporrhaphy, Sacrospinous Ligament Suspension, Axis Dermis Graft Placement, and Cystoscopy on 24./)    HPI:     Patient is a 59 y.o.  presents today for voiding trial. Pt reports doing well today with no complaints.    Gyn History:    Menstrual History  Cycle: No  Menarche Age: 14 years  No Cycle Reason: (!) Surgical  Surgical Reason: hysterectomy    Menopause  Menopause Age: 1 years  Post Menopausal Bleeding: No  Hormone Replacement Therapy: No    Pap History  Last pap date: 24  Result:  (unsatisfactory pap with negative HPV)  History of Abnormal Pap: No  HPV Vaccine Completed: No    [place]  Sexually Active: No  STI History: No  Contraception: No    Breast History  Last Breast Imaging Date: Yes  Date: 10/06/24 (Benign)  History of Abnormal Breast Imaging : No  History of Breast Biopsy: No      Past Medical History:   Diagnosis Date    Hypertension        Past Surgical History:   Procedure Laterality Date    ECTOPIC PREGNANCY SURGERY      HYSTERECTOMY, TOTAL, VAGINAL, WITH COMBINED ANTEROPOSTERIOR COLPORRHAPHY, WITH CYSTOSCOPY N/A 2024    Procedure: COMBINED ANTEROPOSTERIOR COLPORRHAPHY, WITH CYSTOSCOPY;  Surgeon: Tano Banegas MD;  Location: [facility] OR;  Service: OB/GYN;  Laterality: N/A;  [place] Dermis Graft    HYSTERECTOMY, VAGINAL, WITH SALPINGO-OOPHORECTOMY Bilateral 2024    Procedure: HYSTERECTOMY,VAGINAL,WITH SALPINGO-OOPHORECTOMY;  Surgeon: Tano Banegas MD;  Location: [facility] OR;  Service: OB/GYN;  Laterality: Bilateral;  BSO    SACROSPINOUS LIGAMENT FIXATION Bilateral 2024    Procedure: FIXATION, LIGAMENT, SACROSPINOUS;  Surgeon: Tano Banegas MD;  Location: [facility] OR;  Service: OB/GYN;  Laterality: Bilateral;  Axis Dermis and Safron - Marlysy Lotus Lizz    TUBAL LIGATION      Dr. Dayo Banegas       Family History   Problem  "Relation Name Age of Onset    Breast cancer Maternal Grandmother          onset unknown    Breast cancer Other MGA         onset unknown    Cervical cancer Neg Hx      Colon cancer Neg Hx      Ovarian cancer Neg Hx      Uterine cancer Neg Hx         OB History          7    Para   5    Term   4       1    AB   2    Living   4         SAB   1    IAB        Ectopic   1    Multiple        Live Births   5                 Current Outpatient Medications on File Prior to Visit   Medication Sig Dispense Refill    conjugated estrogens (PREMARIN) vaginal cream Place 0.5 g vaginally once daily. 1 applicator 5    docusate sodium (COLACE) 100 MG capsule Take 1 capsule (100 mg total) by mouth 2 (two) times daily. 30 capsule 1    hydroCHLOROthiazide (HYDRODIURIL) 12.5 MG Tab Take 12.5 mg by mouth once daily.      HYDROcodone-acetaminophen (NORCO) 7.5-325 mg per tablet Take 1 tablet by mouth every 6 (six) hours as needed for Pain. (Patient taking differently: Take 1 tablet by mouth every 6 (six) hours as needed for Pain. Per pt "Taking 1/2 tablet every 6 hours as needed.") 12 tablet 0    ibuprofen (ADVIL,MOTRIN) 600 MG tablet Take 1 tablet (600 mg total) by mouth every 6 (six) hours as needed for Pain. 120 tablet 2    MIRALAX 17 gram/dose powder SMARTSI Gram(s) By Mouth       Current Facility-Administered Medications on File Prior to Visit   Medication Dose Route Frequency Provider Last Rate Last Admin    [DISCONTINUED] bisacodyL suppository 10 mg  10 mg Rectal Daily PRN Kim Leiva WHNP        [DISCONTINUED] diphenhydrAMINE capsule 25 mg  25 mg Oral Q4H PRN Kim Leiva WHNP        [DISCONTINUED] diphenhydrAMINE injection 25 mg  25 mg Intravenous Q4H PRN Kim Leiva WHNP        [DISCONTINUED] docusate sodium capsule 100 mg  100 mg Oral BID Kim Leiva WHNP   100 mg at 24 0853    [DISCONTINUED] electrolytes-dextrose (Pedialyte) oral solution 300 mL  300 mL Oral Q4H Kim Leiva WHNP   300 mL at " 11/07/24 0854    [DISCONTINUED] famotidine tablet 20 mg  20 mg Oral BID Kim Leiva WHNP   20 mg at 11/07/24 0853    [DISCONTINUED] HYDROmorphone injection 1 mg  1 mg Intravenous Q6H PRN Kim Leiva WHNP        [DISCONTINUED] ibuprofen tablet 600 mg  600 mg Oral Q6H Kim Leiva WHNP        [DISCONTINUED] mupirocin 2 % ointment   Nasal BID Kim Leiva WHNP        [DISCONTINUED] ondansetron disintegrating tablet 8 mg  8 mg Oral Q8H PRN Kim Leiva WHNP        [DISCONTINUED] oxyCODONE-acetaminophen  mg per tablet 1 tablet  1 tablet Oral Q4H PRKim Cruz WHNP   1 tablet at 11/06/24 1934    [DISCONTINUED] oxyCODONE-acetaminophen 5-325 mg per tablet 1 tablet  1 tablet Oral Q4H PRN Kim Leiva WHNP           Review of Systems:       Review of Systems     Physical Exam:    /78 (BP Location: Left arm, Patient Position: Lying)   Pulse 78   Resp 16   Ht 5' (1.524 m)   Wt 59.9 kg (132 lb)   BMI 25.78 kg/m²     Physical Exam     Consent given for office voiding trial.    Indwelling catheter noted in place and intact. Urine bag removed. Bladder filled with 300 cc normal saline.   Catheter removed and patient allowed to void.    Urethral orifice cleansed with betadine x 3.   In & out catheter inserted into urethra.  PVR: 100 cc  Catheter removed.  Patient tolerated procedure well.      Assessment:   1. Postoperative state             Plan:   1. Postoperative state    Reviewed signs & symptoms of infection. ED precautions given.   RTC for scheduled postoperative exam.   Continue postoperative care and restrictions given on discharge from hospital.  No driving x 2 weeks postoperative.  Patient verbalized understanding and agrees with plan of care.

## 2024-11-08 NOTE — HOSPITAL COURSE
Postoperative course was uneventful.  On postop day 1 vaginal packing and Huston catheter were removed.  Patient was able to ambulate and void.  Postvoid residual was 180-200 cc so Huston catheter with leg bag was placed for discharge.  She was otherwise stable and doing very well.  Pain is minimal.  She desires discharge home.

## 2024-11-20 ENCOUNTER — TELEPHONE (OUTPATIENT)
Dept: FAMILY MEDICINE | Facility: CLINIC | Age: 59
End: 2024-11-20
Payer: MEDICAID

## 2024-11-20 NOTE — TELEPHONE ENCOUNTER
She established care on 9/16/24 and was supposed to follow up in 2 weeks for HTN. I spoke with her on 10/3/24. She said that she was going to be having a hysterectomy and would call us after to schedule. Please call her to see if she is ready to schedule.

## 2024-11-28 ENCOUNTER — NURSE TRIAGE (OUTPATIENT)
Dept: ADMINISTRATIVE | Facility: CLINIC | Age: 59
End: 2024-11-28
Payer: MEDICAID

## 2024-11-28 ENCOUNTER — HOSPITAL ENCOUNTER (EMERGENCY)
Facility: HOSPITAL | Age: 59
Discharge: HOME | End: 2024-11-28
Attending: STUDENT IN AN ORGANIZED HEALTH CARE EDUCATION/TRAINING PROGRAM
Payer: MEDICAID

## 2024-11-28 ENCOUNTER — OCHSNER VIRTUAL EMERGENCY DEPARTMENT (OUTPATIENT)
Facility: CLINIC | Age: 59
End: 2024-11-28
Payer: MEDICAID

## 2024-11-28 VITALS
TEMPERATURE: 98 F | RESPIRATION RATE: 20 BRPM | OXYGEN SATURATION: 100 % | WEIGHT: 132 LBS | SYSTOLIC BLOOD PRESSURE: 174 MMHG | DIASTOLIC BLOOD PRESSURE: 95 MMHG | HEART RATE: 94 BPM | BODY MASS INDEX: 25.91 KG/M2 | HEIGHT: 60 IN

## 2024-11-28 DIAGNOSIS — N39.0 URINARY TRACT INFECTION WITHOUT HEMATURIA, SITE UNSPECIFIED: Primary | ICD-10-CM

## 2024-11-28 DIAGNOSIS — Z90.710 STATUS POST HYSTERECTOMY: ICD-10-CM

## 2024-11-28 LAB
ALBUMIN SERPL-MCNC: 4.6 G/DL (ref 3.4–5)
ALBUMIN/GLOB SERPL: 1.4 RATIO
ALP SERPL-CCNC: 86 UNIT/L (ref 50–144)
ALT SERPL-CCNC: 21 UNIT/L (ref 1–45)
ANION GAP SERPL CALC-SCNC: 7 MEQ/L (ref 2–13)
AST SERPL-CCNC: 27 UNIT/L (ref 14–36)
BACTERIA #/AREA URNS AUTO: ABNORMAL /HPF
BASOPHILS # BLD AUTO: 0.08 X10(3)/MCL (ref 0.01–0.08)
BASOPHILS NFR BLD AUTO: 0.7 % (ref 0.1–1.2)
BILIRUB SERPL-MCNC: 0.4 MG/DL (ref 0–1)
BILIRUB UR QL STRIP.AUTO: NEGATIVE
BUN SERPL-MCNC: 27 MG/DL (ref 7–20)
CALCIUM SERPL-MCNC: 9.9 MG/DL (ref 8.4–10.2)
CHLORIDE SERPL-SCNC: 102 MMOL/L (ref 98–110)
CLARITY UR: CLEAR
CO2 SERPL-SCNC: 28 MMOL/L (ref 21–32)
COLOR UR AUTO: YELLOW
CREAT SERPL-MCNC: 1.12 MG/DL (ref 0.66–1.25)
CREAT/UREA NIT SERPL: 24 (ref 12–20)
EOSINOPHIL # BLD AUTO: 0.36 X10(3)/MCL (ref 0.04–0.36)
EOSINOPHIL NFR BLD AUTO: 3.2 % (ref 0.7–7)
ERYTHROCYTE [DISTWIDTH] IN BLOOD BY AUTOMATED COUNT: 12.7 % (ref 11–14.5)
GFR SERPLBLD CREATININE-BSD FMLA CKD-EPI: 57 ML/MIN/1.73/M2
GLOBULIN SER-MCNC: 3.4 GM/DL (ref 2–3.9)
GLUCOSE SERPL-MCNC: 125 MG/DL (ref 70–115)
GLUCOSE UR QL STRIP: NEGATIVE
HCT VFR BLD AUTO: 32.5 % (ref 36–48)
HGB BLD-MCNC: 10.8 G/DL (ref 11.8–16)
HGB UR QL STRIP: ABNORMAL
IMM GRANULOCYTES # BLD AUTO: 0.05 X10(3)/MCL (ref 0–0.03)
IMM GRANULOCYTES NFR BLD AUTO: 0.4 % (ref 0–0.5)
KETONES UR QL STRIP: NEGATIVE
LEUKOCYTE ESTERASE UR QL STRIP: ABNORMAL
LYMPHOCYTES # BLD AUTO: 2.98 X10(3)/MCL (ref 1.16–3.74)
LYMPHOCYTES NFR BLD AUTO: 26.4 % (ref 20–55)
MCH RBC QN AUTO: 31.3 PG (ref 27–34)
MCHC RBC AUTO-ENTMCNC: 33.2 G/DL (ref 31–37)
MCV RBC AUTO: 94.2 FL (ref 79–99)
MONOCYTES # BLD AUTO: 0.85 X10(3)/MCL (ref 0.24–0.36)
MONOCYTES NFR BLD AUTO: 7.5 % (ref 4.7–12.5)
NEUTROPHILS # BLD AUTO: 6.96 X10(3)/MCL (ref 1.56–6.13)
NEUTROPHILS NFR BLD AUTO: 61.8 % (ref 37–73)
NITRITE UR QL STRIP: NEGATIVE
NRBC BLD AUTO-RTO: 0 %
PH UR STRIP: 6 [PH]
PLATELET # BLD AUTO: 410 X10(3)/MCL (ref 140–371)
PMV BLD AUTO: 10.3 FL (ref 9.4–12.4)
POTASSIUM SERPL-SCNC: 3.2 MMOL/L (ref 3.5–5.1)
PROT SERPL-MCNC: 8 GM/DL (ref 6.3–8.2)
PROT UR QL STRIP: ABNORMAL
RBC # BLD AUTO: 3.45 X10(6)/MCL (ref 4–5.1)
RBC #/AREA URNS AUTO: ABNORMAL /HPF
SODIUM SERPL-SCNC: 137 MMOL/L (ref 136–145)
SP GR UR STRIP.AUTO: 1.02 (ref 1–1.03)
SQUAMOUS #/AREA URNS AUTO: ABNORMAL /HPF
UROBILINOGEN UR STRIP-ACNC: 0.2
WBC # BLD AUTO: 11.28 X10(3)/MCL (ref 4–11.5)
WBC #/AREA URNS AUTO: ABNORMAL /HPF

## 2024-11-28 PROCEDURE — 81015 MICROSCOPIC EXAM OF URINE: CPT | Performed by: STUDENT IN AN ORGANIZED HEALTH CARE EDUCATION/TRAINING PROGRAM

## 2024-11-28 PROCEDURE — 85025 COMPLETE CBC W/AUTO DIFF WBC: CPT | Performed by: STUDENT IN AN ORGANIZED HEALTH CARE EDUCATION/TRAINING PROGRAM

## 2024-11-28 PROCEDURE — 80053 COMPREHEN METABOLIC PANEL: CPT | Performed by: STUDENT IN AN ORGANIZED HEALTH CARE EDUCATION/TRAINING PROGRAM

## 2024-11-28 PROCEDURE — 81003 URINALYSIS AUTO W/O SCOPE: CPT | Performed by: STUDENT IN AN ORGANIZED HEALTH CARE EDUCATION/TRAINING PROGRAM

## 2024-11-28 PROCEDURE — 25500020 PHARM REV CODE 255: Performed by: STUDENT IN AN ORGANIZED HEALTH CARE EDUCATION/TRAINING PROGRAM

## 2024-11-28 PROCEDURE — 96374 THER/PROPH/DIAG INJ IV PUSH: CPT

## 2024-11-28 PROCEDURE — 87086 URINE CULTURE/COLONY COUNT: CPT | Performed by: STUDENT IN AN ORGANIZED HEALTH CARE EDUCATION/TRAINING PROGRAM

## 2024-11-28 PROCEDURE — 25000003 PHARM REV CODE 250: Performed by: STUDENT IN AN ORGANIZED HEALTH CARE EDUCATION/TRAINING PROGRAM

## 2024-11-28 PROCEDURE — 63600175 PHARM REV CODE 636 W HCPCS: Performed by: STUDENT IN AN ORGANIZED HEALTH CARE EDUCATION/TRAINING PROGRAM

## 2024-11-28 PROCEDURE — 99285 EMERGENCY DEPT VISIT HI MDM: CPT | Mod: 25

## 2024-11-28 RX ORDER — NITROFURANTOIN 25; 75 MG/1; MG/1
100 CAPSULE ORAL 2 TIMES DAILY
Qty: 10 CAPSULE | Refills: 0 | Status: SHIPPED | OUTPATIENT
Start: 2024-11-28 | End: 2024-12-03

## 2024-11-28 RX ORDER — CEFTRIAXONE 1 G/1
1 INJECTION, POWDER, FOR SOLUTION INTRAMUSCULAR; INTRAVENOUS
Status: COMPLETED | OUTPATIENT
Start: 2024-11-28 | End: 2024-11-28

## 2024-11-28 RX ADMIN — CEFTRIAXONE SODIUM 1 G: 1 INJECTION, POWDER, FOR SOLUTION INTRAMUSCULAR; INTRAVENOUS at 03:11

## 2024-11-28 RX ADMIN — IOHEXOL 94 ML: 300 INJECTION, SOLUTION INTRAVENOUS at 02:11

## 2024-11-28 RX ADMIN — POTASSIUM BICARBONATE 40 MEQ: 391 TABLET, EFFERVESCENT ORAL at 03:11

## 2024-11-28 NOTE — TELEPHONE ENCOUNTER
Spoke with pt.who reports she had hysterectomy 11/6. States that her incision is draining yellow colored fluid. States that site is noted to be opened as well, and bas smelling for past few days. Also reports redness to site She denies fever.    Called Dr. Dumas no answer.  left  Called Dr. Dumas x2 with no answer VM left    1130 secure chat sent to [facility]    Dr. Aguilera pt advised to be seen in ED.    Pt. Informed, and verbalized understanding    Reason for Disposition   [1] Pus or bad-smelling fluid draining from incision AND [2] no fever    Additional Information   Negative: [1] Major abdominal surgical incision AND [2] wound gaping open AND [3] visible internal organs   Negative: Sounds like a life-threatening emergency to the triager   Negative: [1] Bleeding from incision AND [2] won't stop after 10 minutes of direct pressure   Negative: [1] Widespread rash AND [2] bright red, sunburn-like   Negative: Severe pain in the incision   Negative: [1] Suture came out early AND [2] wound gaping AND [3] < 48 hours since sutures placed   Negative: [1] Incision gaping open AND [2] length of opening > 2 inches (5 cm)   Negative: Patient sounds very sick or weak to the triager   Negative: Sounds like a serious complication to the triager   Negative: Fever > 100.5 F (38.1 C)   Negative: [1] Incision looks infected (spreading redness, pain) AND [2] fever > 99.5 F (37.5 C)   Negative: [1] Incision looks infected (spreading redness, pain) AND [2] large red area (> 2 in. or 5 cm)   Negative: [1] Incision looks infected (spreading redness, pain) AND [2] face wound   Negative: [1] Red streak runs from the incision AND [2] longer than 1 inch (2.5 cm)    Protocols used: Post-Op Incision Symptoms-A-AH

## 2024-11-28 NOTE — PLAN OF CARE-OVED
[facility] Plan of Care Note    Referral source: RN    Discussed patient with RN  59 y.o. female s/p hyst with tender malodorous surgical scar.       Disposition recommended:  To ED

## 2024-11-28 NOTE — ED PROVIDER NOTES
Encounter Date: 11/28/2024       History     Chief Complaint   Patient presents with    Wound Check     Pt reports having hysterectomy on the 6th of this month, reports x1 week yellow drainage, foul odor and reports the incision is opening up.  Pt reports the surgery was done vaginally.      HPI  Patient was a 59-year-old female past medical history hypertension, recent hysterectomy, early November, who presents to ER with concerns of the wound with have dehisced.  Patient states she has been having some yellowish drainage, foul odor noted from her vagina.  She denies any fevers, abdominal pain, nausea, vomiting.  Presents to ER for further evaluation.  Review of patient's allergies indicates:  No Known Allergies  Past Medical History:   Diagnosis Date    Hypertension      Past Surgical History:   Procedure Laterality Date    ECTOPIC PREGNANCY SURGERY  1992    HYSTERECTOMY, TOTAL, VAGINAL, WITH COMBINED ANTEROPOSTERIOR COLPORRHAPHY, WITH CYSTOSCOPY N/A 11/6/2024    Procedure: COMBINED ANTEROPOSTERIOR COLPORRHAPHY, WITH CYSTOSCOPY;  Surgeon: Tano Banegas MD;  Location: [facility];  Service: OB/GYN;  Laterality: N/A;  Ailey Dermis Graft    HYSTERECTOMY, VAGINAL, WITH SALPINGO-OOPHORECTOMY Bilateral 11/6/2024    Procedure: HYSTERECTOMY,VAGINAL,WITH SALPINGO-OOPHORECTOMY;  Surgeon: Tano Banegas MD;  Location: [facility];  Service: OB/GYN;  Laterality: Bilateral;  BSO    SACROSPINOUS LIGAMENT FIXATION Bilateral 11/6/2024    Procedure: FIXATION, LIGAMENT, SACROSPINOUS;  Surgeon: Tano Banegas MD;  Location: [facility];  Service: OB/GYN;  Laterality: Bilateral;  Axis Dermis and Safron - Notify Lotus Lizz    TUBAL LIGATION  1995    Dr. Dayo Banegas     Family History   Problem Relation Name Age of Onset    Breast cancer Maternal Grandmother          onset unknown    Breast cancer Other MGA         onset unknown    Cervical cancer Neg Hx      Colon cancer Neg Hx      Ovarian cancer Neg Hx      Uterine cancer Neg Hx        Social History     Tobacco Use    Smoking status: Every Day     Current packs/day: 1.00     Types: Cigarettes    Smokeless tobacco: Never   Substance Use Topics    Alcohol use: Never     Comment: on ocassion    Drug use: Yes     Types: Marijuana     Comment: 3 TIMES PER WEEK     Review of Systems   Constitutional:  Negative for fever.   HENT:  Negative for sore throat.    Respiratory:  Negative for shortness of breath.    Cardiovascular:  Negative for chest pain.   Gastrointestinal:  Negative for nausea.   Genitourinary:  Positive for vaginal discharge. Negative for dysuria.   Musculoskeletal:  Negative for back pain.   Skin:  Negative for rash.   Neurological:  Negative for weakness.   Hematological:  Does not bruise/bleed easily.   All other systems reviewed and are negative.      Physical Exam     Initial Vitals [11/28/24 1259]   BP Pulse Resp Temp SpO2   (!) 179/97 99 16 98.4 °F (36.9 °C) 98 %      MAP       --         Physical Exam    Nursing note and vitals reviewed.  Constitutional: Vital signs are normal. She appears well-developed and well-nourished. She is not diaphoretic. She is active.  Non-toxic appearance. She does not appear ill. No distress.   HENT:   Head: Normocephalic and atraumatic.   Eyes: Conjunctivae are normal. Pupils are equal, round, and reactive to light. Right conjunctiva is not injected. Left conjunctiva is not injected.   Neck: Trachea normal. Neck supple.   Normal range of motion.   Full passive range of motion without pain.     Cardiovascular:  Normal rate, regular rhythm, S1 normal, S2 normal, intact distal pulses and normal pulses.           Pulmonary/Chest: Breath sounds normal. No respiratory distress. She has no wheezes.   Abdominal: Abdomen is soft. Bowel sounds are normal. There is no abdominal tenderness.   Genitourinary:    Genitourinary Comments: Chaperoned vaginal exam with RN negative for any discharge.  Sutures are intact.  No vaginal bleeding noted.  Posterior  vaginal vault intact.     Musculoskeletal:      Cervical back: Full passive range of motion without pain, normal range of motion and neck supple. No rigidity.      Right lower leg: No swelling. No edema.      Left lower leg: No swelling. No edema.     Neurological: She is alert and oriented to person, place, and time.   Skin: Skin is warm and dry. Capillary refill takes less than 2 seconds.         ED Course   Procedures  Labs Reviewed   COMPREHENSIVE METABOLIC PANEL - Abnormal       Result Value    Sodium 137      Potassium 3.2 (*)     Chloride 102      CO2 28      Glucose 125 (*)     Blood Urea Nitrogen 27 (*)     Creatinine 1.12      Calcium 9.9      Protein Total 8.0      Albumin 4.6      Globulin 3.4      Albumin/Globulin Ratio 1.4      Bilirubin Total 0.4      ALP 86      ALT 21      AST 27      eGFR 57      Anion Gap 7.0      BUN/Creatinine Ratio 24 (*)    CBC WITH DIFFERENTIAL - Abnormal    WBC 11.28      RBC 3.45 (*)     Hgb 10.8 (*)     Hct 32.5 (*)     MCV 94.2      MCH 31.3      MCHC 33.2      RDW 12.7      Platelet 410 (*)     MPV 10.3      Neut % 61.8      Lymph % 26.4      Mono % 7.5      Eos % 3.2      Basophil % 0.7      Lymph # 2.98      Neut # 6.96 (*)     Mono # 0.85 (*)     Eos # 0.36      Baso # 0.08      IG# 0.05 (*)     IG% 0.4      NRBC% 0.0     URINALYSIS, REFLEX TO URINE CULTURE - Abnormal    Color, UA Yellow      Appearance, UA Clear      Specific Gravity, UA 1.020      pH, UA 6.0      Protein, UA Trace (*)     Glucose, UA Negative      Ketones, UA Negative      Blood, UA Large (*)     Bilirubin, UA Negative      Urobilinogen, UA 0.2      Nitrites, UA Negative      Leukocyte Esterase, UA Moderate (*)     Narrative:      URINE STABILITY IS 2 HOURS AT ROOM TEMP OR    SIX HOURS REFRIGERATED. PERFORMING TESTING ON    SPECIMENS GREATER THAN THIS AGE MAY AFFECT THE    FOLLOWING TESTS:    PH          SPECIFIC GRAVITY           BLOOD    CLARITY     BILIRUBIN               UROBILINOGEN    URINALYSIS, MICROSCOPIC - Abnormal    Bacteria, UA Moderate (*)     RBC, UA 0-2      WBC, UA 21-50 (*)     Squamous Epithelial Cells, UA Few (*)    CULTURE, URINE   CBC W/ AUTO DIFFERENTIAL    Narrative:     The following orders were created for panel order CBC auto differential.  Procedure                               Abnormality         Status                     ---------                               -----------         ------                     CBC with Differential[[id number]]       Abnormal            Final result                 Please view results for these tests on the individual orders.          Imaging Results              CT Abdomen Pelvis With IV Contrast NO Oral Contrast (Final result)  Result time 11/28/24 15:06:34      Final result by Hiram Chua MD (11/28/24 15:06:34)                   Impression:      Postoperative changes consistent with hysterectomy are identified.  No abnormalities are identified in the surgical bed.      Electronically signed by: Hiram Chua  Date:    11/28/2024  Time:    15:06               Narrative:    EXAMINATION:  CT ABDOMEN PELVIS WITH IV CONTRAST    CLINICAL HISTORY:  Patient presents to ED complaining of yellowish discharge from vagina, status post hysterectomy.  Denies any fever, chills or abdominal pain.;    TECHNIQUE:  Routine axial CT images of the abdomen were obtained 100 cc IV contrast.  No oral contrast was given.Coronal and Sagittal reformatted images were also obtained.    Total DLP: 299 mGy.cm    Automatic exposure control was utilized to reduce the patient's dose    COMPARISON:  None    FINDINGS:  Thorax: Partially visualized breast tissue is unremarkable.    Heart: Normal in size with no pericardial effusion.    Lungs: Both lung bases are well aerated with no consolidation or pleural effusion.    Liver: Normal in size and attenuation. Tiny hypodensity in the left lobe is too small to characterize by CT criteria but likely represents  a tiny cyst.    Gallbladder/biliary system: Calcified gallstone is noted near the neck of the gallbladder.  The remainder the gallbladder appears unremarkable.  No secondary signs of acute cholecystitis are identified.  No intrahepatic or extrahepatic ductal dilatation.    Pancreas: No mass or peripancreatic fat stranding.    Spleen: Unremarkable.    GI Tract/ Mesentery: The stomach is unremarkable.  No evidence of bowel obstruction or inflammation. The appendix is unremarkable.No pericolonic inflammatory changes or discrete colonic masses are identified.  Few scattered noninflamed sigmoid diverticula are noted.    Adrenals: Unremarkable.    Kidneys/ Ureters: Normal in size and location. No hydronephrosis or nephrolithiasis. Both ureters are normal in course caliber with no ureteral stones or hydroureter.    Bladder: Normal in contour with no bladder wall thickening.    Reproductive organs: Postoperative changes consistent with hysterectomy are identified.  The vaginal cuff appears mildly prominent but otherwise unremarkable.  No focal fluid collections are identified to suggest hematoma or seroma or abscess formation.    Retroperitoneum: No significant adenopathy.    Peritoneal space: No ascites. No free air.    Abdominal wall: Unremarkable.    Vasculature: No significant calcific atherosclerosis of the abdominal aorta.  No evidence of aneurysmal dilatation.    Bones: Degenerative disease of the spine.  No acute fractures or lytic lesions.                                       Medications   iohexoL (OMNIPAQUE 300) injection 100 mL (94 mLs Intravenous Given 11/28/24 1412)   cefTRIAXone injection 1 g (1 g Intravenous Given 11/28/24 1503)   potassium bicarbonate disintegrating tablet 40 mEq (40 mEq Oral Given 11/28/24 1517)     Medical Decision Making  Patient was a 59-year-old female past medical history hypertension, recent hysterectomy, early November, who presents to ER with concerns of the wound with have  dehisced.     Differential diagnosis includes but not limited to: Wound dehiscence, STD, UTI     Patient vitals on arrival stable.  Physical exam was grossly unremarkable.  Evaluation of vagina, chaperoned, negative for any wound dehiscence.  No active bleeding.  No discharge noted.  No foul smell.  CTA obtained including laboratories.  No gross leukocytosis.  No viji anemia.  Mild hypokalemia, repleted here in the ED.  Urine positive for UTI, treated with Rocephin.  CT abdomen pelvis obtained negative for any abnormalities.  Postop changes consistent with hysterectomy.  Patient is at this time stable for discharge, encouraged to follow up with her PCP in next 2-3 days, follow up with her OBGYN as scheduled.  Strict return precautions given.  Patient was stable for discharge.    Odin Hernández M.D.  Emergency Medicine        Amount and/or Complexity of Data Reviewed  Labs: ordered.  Radiology: ordered.    Risk  Prescription drug management.                                      Clinical Impression:  Final diagnoses:  [N39.0] Urinary tract infection without hematuria, site unspecified (Primary)  [Z90.710] Status post hysterectomy          ED Disposition Condition    Discharge Stable          ED Prescriptions       Medication Sig Dispense Start Date End Date Auth. Provider    nitrofurantoin, macrocrystal-monohydrate, (MACROBID) 100 MG capsule Take 1 capsule (100 mg total) by mouth 2 (two) times daily. for 5 days 10 capsule 11/28/2024 12/3/2024 Odin Hernández MD          Follow-up Information       Follow up With Specialties Details Why Contact Info    Kayleen Nix, FNP-C Family Medicine Schedule an appointment as soon as possible for a visit in 2 days For follow up [address] [facility] [address]  [phone]      Your OBGYN  Schedule an appointment as soon as possible for a visit in 2 days For follow up     [facility] Emergency Medicine  If symptoms worsen [address]  [place] [address]  [phone]             Odin Hernández MD  11/28/24 1746

## 2024-12-01 LAB — BACTERIA UR CULT: NO GROWTH

## 2024-12-02 ENCOUNTER — TELEPHONE (OUTPATIENT)
Dept: OBSTETRICS AND GYNECOLOGY | Facility: CLINIC | Age: 59
End: 2024-12-02
Payer: MEDICAID

## 2024-12-02 NOTE — TELEPHONE ENCOUNTER
"Pt states "I do feel somewhat better. I do not have fever or anything." Offered pt appt for Wednesday 12/4/24 @ 3:20 pm. ED precautions given. Pt verbalized understanding & agrees with plan of care.  "

## 2024-12-03 LAB — BEAKER SEE SCANNED REPORT: NORMAL

## 2025-01-13 ENCOUNTER — TELEPHONE (OUTPATIENT)
Dept: OBSTETRICS AND GYNECOLOGY | Facility: CLINIC | Age: 60
End: 2025-01-13

## 2025-03-23 NOTE — PLAN OF CARE
Discharge instructions provided to patient and family, allowed time for questions, verbalized understanding.    Discharged home in stable condition via ambulation per pt preference,declined wheel chair, gait steady, accompanied by family, no s/s of distress noted   Discharged

## 2025-07-16 ENCOUNTER — TELEPHONE (OUTPATIENT)
Dept: OBSTETRICS AND GYNECOLOGY | Facility: CLINIC | Age: 60
End: 2025-07-16
Payer: MEDICAID

## 2025-07-16 NOTE — TELEPHONE ENCOUNTER
----- Message from Beata sent at 7/16/2025  2:33 PM CDT -----  Regarding: Call Back  Type:  Patient Returning Call    Who Called:  Who Left Message for Patient:  Does the patient know what this is regarding?:  Would the patient rather a call back or a response via [facility]?   Best Call Back Number:[phone]  Additional Information: Pt is asking for refill on HCTZ but not sure if Dr. Freedman monitors this or PCP? Also had many cancelled appts. [facility] is calling for refill but also stating they are needing new script for tabs not capsules.

## (undated) DEVICE — SUT GORETEX CV2-THX26

## (undated) DEVICE — Device

## (undated) DEVICE — DEVICE ENSEAL X1 CRV JAW 37CM

## (undated) DEVICE — GLOVE SENSICARE PI SURG 7

## (undated) DEVICE — SYR 10CC LUER LOCK

## (undated) DEVICE — SUC YANK POOLE TIP RIGID

## (undated) DEVICE — GLOVE SENSICARE PI GRN 6.5

## (undated) DEVICE — CATH URETHRAL RED 16FR

## (undated) DEVICE — SUT VICRYL 2-0 CT-1 18 CR

## (undated) DEVICE — GLOVE SENSICARE PI MICRO 8

## (undated) DEVICE — SEALER LIGASURE MARYLAND 37CM

## (undated) DEVICE — NDL SPINAL 18GX3.5 SPINOCAN

## (undated) DEVICE — DEVICE ENSEAL X1 LARGE JAW

## (undated) DEVICE — SUT 3/0 27IN COATED VICRYL

## (undated) DEVICE — SCALPEL #11 BLADE STRL DISP

## (undated) DEVICE — GOWN ORBIS LVL 4 XL 47IN

## (undated) DEVICE — SYR 5CC 22G X 1 1/2 IN

## (undated) DEVICE — BLADE SURG CARBON STEEL #10

## (undated) DEVICE — SUT VICRYL PLUS COAT 0 36IN

## (undated) DEVICE — SHEARS HARMONIC 5CM 36CM

## (undated) DEVICE — ADHESIVE DERMABOND ADVANCED

## (undated) DEVICE — SCALPEL #10 BLADE STRL DISP

## (undated) DEVICE — SYR ONLY LUER LOCK 20CC

## (undated) DEVICE — SUT MONOCYRL 4-0 PS2 UND

## (undated) DEVICE — NDL INSUFFLATION VERRES 120MM

## (undated) DEVICE — BLADE SURG #15 CARBON STEEL

## (undated) DEVICE — GLOVE SENSICARE PI GRN 7.5

## (undated) DEVICE — SET CYSTO IRRIGATION UNIV SPIK

## (undated) DEVICE — SLEEVE KII ADV FIX 5X100MM

## (undated) DEVICE — GLOVE SENSICARE PI GRN 7

## (undated) DEVICE — DRAPE LEGGINGS CUFF 31X48IN

## (undated) DEVICE — SCISSOR 5MMX35CM DIRECT DRIVE

## (undated) DEVICE — HOOK STAY ELAS 5MM 8EA/PK

## (undated) DEVICE — SUT CHROMIC 3-0 SH 27IN GUT

## (undated) DEVICE — SHEET DRAPE FAN-FOLDED 3/4

## (undated) DEVICE — GLOVE SENSICARE PI SURG 7.5

## (undated) DEVICE — SOL NACL IRR 1000ML BTL

## (undated) DEVICE — DRAPE UND BUTT W/POUCH 40X44IN

## (undated) DEVICE — COVER MAYO STAND 23X54IN

## (undated) DEVICE — SUT VICRYL COAT 910 1X36IN

## (undated) DEVICE — PAD PINK TRENDELENBURG POS XL

## (undated) DEVICE — GLOVE SENSICARE PI MICRO 6.5

## (undated) DEVICE — BANDAGE ROLL COTTN 4.5INX4.1YD

## (undated) DEVICE — SUT 2/0 36IN COATED VICRYL

## (undated) DEVICE — DRAPE UNDER BUTTOCKS SUC PORT

## (undated) DEVICE — SUT 0 VICRYL / UR6 (J603)

## (undated) DEVICE — TRAY DRY SKIN SCRUB PREP

## (undated) DEVICE — GLOVE PROTEXIS PI SYN SURG 8.5

## (undated) DEVICE — SUT 0 8-18 IN CTD VICRYL

## (undated) DEVICE — SUT VICRYL PLUS 0 CT-1 18IN

## (undated) DEVICE — SUT CTD VICRYL VIL BR CR/SH

## (undated) DEVICE — GOWN POLY REINF BRTH SLV 2XL

## (undated) DEVICE — SUT CTD VICRYL VIL BR SH 27

## (undated) DEVICE — RETRACTOR LONE STAR 14.1X14.1

## (undated) DEVICE — ELECTRODE REM POLYHESIVE II

## (undated) DEVICE — NDL SAFETY HYPO 25GX1IN

## (undated) DEVICE — COVER PROXIMA MAYO STAND

## (undated) DEVICE — TROCAR ENDO Z THREAD KII 5X100

## (undated) DEVICE — DRESSING TRANS 4X10 TEGADERM

## (undated) DEVICE — SUT VCRL ENDOLOOP 0 18 VIOL